# Patient Record
Sex: MALE | Race: WHITE | Employment: OTHER | URBAN - METROPOLITAN AREA
[De-identification: names, ages, dates, MRNs, and addresses within clinical notes are randomized per-mention and may not be internally consistent; named-entity substitution may affect disease eponyms.]

---
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
## 2022-02-09 DIAGNOSIS — G80.0 SPASTIC QUADRIPLEGIC CEREBRAL PALSY (HCC): ICD-10-CM

## 2022-02-09 DIAGNOSIS — Z97.8 PRESENCE OF INTRATHECAL BACLOFEN PUMP: ICD-10-CM

## 2022-02-09 DIAGNOSIS — G40.909 SEIZURE DISORDER (HCC): ICD-10-CM

## 2022-02-10 NOTE — TELEPHONE ENCOUNTER
Pharmacy requesting refill of Depakote. Medication active on med list yes      Date of last fill: 11/3/21  verified on 2/10/2022   verified by [facility] LPN      Date of last appointment 11/3/21    Next Visit Date:  Visit date not found, no follow up scheduled. Please approve for Dr Izaguirre's pt as he is out of office, thanks.

## 2022-02-11 RX ORDER — DIVALPROEX SODIUM 125 MG/1
TABLET, DELAYED RELEASE ORAL
Qty: 56 TABLET | Refills: 0 | Status: SHIPPED | OUTPATIENT
Start: 2022-02-11 | End: 2022-03-15

## 2022-02-16 DIAGNOSIS — G80.0 SPASTIC QUADRIPLEGIC CEREBRAL PALSY (HCC): ICD-10-CM

## 2022-02-16 DIAGNOSIS — G40.909 SEIZURE DISORDER (HCC): ICD-10-CM

## 2022-02-16 DIAGNOSIS — Z97.8 PRESENCE OF INTRATHECAL BACLOFEN PUMP: ICD-10-CM

## 2022-02-17 ENCOUNTER — TELEPHONE (OUTPATIENT)
Dept: PEDIATRIC UROLOGY | Age: 22
End: 2022-02-17

## 2022-02-17 ENCOUNTER — HOSPITAL ENCOUNTER (OUTPATIENT)
Age: 22
Setting detail: SPECIMEN
Discharge: HOME | End: 2022-02-17
Payer: MEDICAID

## 2022-02-17 DIAGNOSIS — R33.9 URINARY RETENTION: Primary | ICD-10-CM

## 2022-02-17 DIAGNOSIS — R33.9 URINARY RETENTION: ICD-10-CM

## 2022-02-17 LAB
-: ABNORMAL
AMORPHOUS: ABNORMAL
BACTERIA: ABNORMAL
BILIRUBIN URINE: NEGATIVE
CASTS UA: ABNORMAL /LPF (ref 0–2)
COLOR: YELLOW
CRYSTALS, UA: ABNORMAL /HPF
EPITHELIAL CELLS UA: ABNORMAL /HPF (ref 0–5)
GLUCOSE URINE: NEGATIVE
KETONES, URINE: NEGATIVE
LEUKOCYTE ESTERASE, URINE: ABNORMAL
MUCUS: ABNORMAL
NITRITE, URINE: NEGATIVE
OTHER OBSERVATIONS UA: ABNORMAL
PH UA: 8 (ref 5–8)
PROTEIN UA: NEGATIVE
RBC UA: ABNORMAL /HPF (ref 0–2)
RENAL EPITHELIAL, UA: ABNORMAL /HPF
SPECIFIC GRAVITY UA: 1.02 (ref 1–1.03)
TRICHOMONAS: ABNORMAL
TURBIDITY: ABNORMAL
URINE HGB: NEGATIVE
UROBILINOGEN, URINE: NORMAL
WBC UA: ABNORMAL /HPF (ref 0–5)
YEAST: ABNORMAL

## 2022-02-17 PROCEDURE — 87086 URINE CULTURE/COLONY COUNT: CPT

## 2022-02-17 PROCEDURE — 81001 URINALYSIS AUTO W/SCOPE: CPT

## 2022-02-17 RX ORDER — DIVALPROEX SODIUM 125 MG/1
TABLET, DELAYED RELEASE ORAL
Qty: 56 TABLET | Refills: 0 | OUTPATIENT
Start: 2022-02-17

## 2022-02-17 NOTE — TELEPHONE ENCOUNTER
Mom called and stated that she feels Marianna Campos has a UTI. She requested an order for UC. Order placed. S/s of cloudy, stomach pain, low grade fever on and off. Volumes are up and down.

## 2022-02-18 LAB
CULTURE: NORMAL
Lab: NORMAL
SPECIMEN DESCRIPTION: NORMAL

## 2022-02-18 NOTE — RESULT ENCOUNTER NOTE
UC negative, although UA + for  WBC's. Mom says the sx of low grade fever, pain come and go. Mom will continue to watch him and repeat the UC if the sx seem to be more consistent. Mom says he gets this way with viral illnesses as well.

## 2022-03-09 DIAGNOSIS — Z97.8 PRESENCE OF INTRATHECAL BACLOFEN PUMP: ICD-10-CM

## 2022-03-09 DIAGNOSIS — G40.909 SEIZURE DISORDER (HCC): ICD-10-CM

## 2022-03-09 DIAGNOSIS — G80.0 SPASTIC QUADRIPLEGIC CEREBRAL PALSY (HCC): ICD-10-CM

## 2022-03-10 NOTE — TELEPHONE ENCOUNTER
Pharmacy requesting refill of levetiracetam.      Medication active on med list yes      Date of last fill: 11/3/21  with 3 refills verified on 3/10/22  verified by BAUDILIO RIVERA      Date of last appointment 11/3/21    Next Visit Date:  3/23/22

## 2022-03-10 NOTE — TELEPHONE ENCOUNTER
Pharmacy requesting refill of depakote.       Medication active on med list yes      Date of last fill: 2/11/22  with 0 refills verified on 3/10/22  verified by BAUDILIO RIVERA      Date of last appointment 11/3/21    Next Visit Date:  3/23/22

## 2022-03-15 RX ORDER — LEVETIRACETAM 100 MG/ML
SOLUTION ORAL
Qty: 450 ML | Refills: 3 | Status: SHIPPED | OUTPATIENT
Start: 2022-03-15 | End: 2022-03-23 | Stop reason: SDUPTHER

## 2022-03-15 RX ORDER — DIVALPROEX SODIUM 125 MG/1
TABLET, DELAYED RELEASE ORAL
Qty: 56 TABLET | Refills: 0 | Status: SHIPPED | OUTPATIENT
Start: 2022-03-15 | End: 2022-03-23 | Stop reason: SDUPTHER

## 2022-03-18 ENCOUNTER — HOSPITAL ENCOUNTER (OUTPATIENT)
Age: 22
Discharge: HOME | End: 2022-03-18
Payer: MEDICAID

## 2022-03-18 DIAGNOSIS — Z51.81 MEDICATION MONITORING ENCOUNTER: ICD-10-CM

## 2022-03-18 DIAGNOSIS — G40.909 SEIZURE DISORDER (HCC): ICD-10-CM

## 2022-03-18 LAB
ABSOLUTE EOS #: 0.63 K/UL (ref 0–0.4)
ABSOLUTE IMMATURE GRANULOCYTE: 0 K/UL (ref 0–0.3)
ABSOLUTE LYMPH #: 2.34 K/UL (ref 1.2–5.2)
ABSOLUTE MONO #: 0.54 K/UL (ref 0.1–1.4)
ALBUMIN SERPL-MCNC: 4.7 G/DL (ref 3.5–5.2)
ALBUMIN/GLOBULIN RATIO: 1.9 (ref 1–2.5)
ALP BLD-CCNC: 106 U/L (ref 40–129)
ALT SERPL-CCNC: 22 U/L (ref 5–41)
ANION GAP SERPL CALCULATED.3IONS-SCNC: 15 MMOL/L (ref 9–17)
AST SERPL-CCNC: 18 U/L
BASOPHILS # BLD: 0 % (ref 0–2)
BASOPHILS ABSOLUTE: 0 K/UL (ref 0–0.2)
BILIRUB SERPL-MCNC: 0.19 MG/DL (ref 0.3–1.2)
BILIRUBIN DIRECT: <0.08 MG/DL
BILIRUBIN, INDIRECT: ABNORMAL MG/DL (ref 0–1)
BUN BLDV-MCNC: 10 MG/DL (ref 6–20)
CALCIUM SERPL-MCNC: 9.6 MG/DL (ref 8.6–10.4)
CHLORIDE BLD-SCNC: 106 MMOL/L (ref 98–107)
CO2: 23 MMOL/L (ref 20–31)
CREAT SERPL-MCNC: 0.37 MG/DL (ref 0.7–1.2)
EOSINOPHILS RELATIVE PERCENT: 7 % (ref 1–4)
GFR NON-AFRICAN AMERICAN: ABNORMAL ML/MIN
GFR SERPL CREATININE-BSD FRML MDRD: ABNORMAL ML/MIN/{1.73_M2}
GLUCOSE BLD-MCNC: 100 MG/DL (ref 70–99)
HCT VFR BLD CALC: 48.5 % (ref 40.7–50.3)
HEMOGLOBIN: 15.8 G/DL (ref 13–17)
IMMATURE GRANULOCYTES: 0 %
LYMPHOCYTES # BLD: 26 % (ref 25–45)
MCH RBC QN AUTO: 29 PG (ref 25.2–33.5)
MCHC RBC AUTO-ENTMCNC: 32.6 G/DL (ref 28.4–34.8)
MCV RBC AUTO: 89.2 FL (ref 82.6–102.9)
MONOCYTES # BLD: 6 % (ref 2–8)
MORPHOLOGY: NORMAL
NRBC AUTOMATED: 0 PER 100 WBC
PDW BLD-RTO: 12.9 % (ref 11.8–14.4)
PLATELET # BLD: 316 K/UL (ref 138–453)
PMV BLD AUTO: 11.5 FL (ref 8.1–13.5)
POTASSIUM SERPL-SCNC: 4.2 MMOL/L (ref 3.7–5.3)
RBC # BLD: 5.44 M/UL (ref 4.21–5.77)
SEG NEUTROPHILS: 61 % (ref 34–64)
SEGMENTED NEUTROPHILS ABSOLUTE COUNT: 5.49 K/UL (ref 1.8–8)
SODIUM BLD-SCNC: 144 MMOL/L (ref 135–144)
TOTAL PROTEIN: 7.2 G/DL (ref 6.4–8.3)
WBC # BLD: 9 K/UL (ref 4.5–13.5)

## 2022-03-18 PROCEDURE — 80048 BASIC METABOLIC PNL TOTAL CA: CPT

## 2022-03-18 PROCEDURE — 85025 COMPLETE CBC W/AUTO DIFF WBC: CPT

## 2022-03-18 PROCEDURE — 80076 HEPATIC FUNCTION PANEL: CPT

## 2022-03-18 PROCEDURE — 36415 COLL VENOUS BLD VENIPUNCTURE: CPT

## 2022-03-23 ENCOUNTER — OFFICE VISIT (OUTPATIENT)
Dept: NEUROLOGY | Age: 22
End: 2022-03-23
Payer: MEDICAID

## 2022-03-23 VITALS
DIASTOLIC BLOOD PRESSURE: 84 MMHG | WEIGHT: 140 LBS | HEART RATE: 77 BPM | SYSTOLIC BLOOD PRESSURE: 132 MMHG | BODY MASS INDEX: 25.76 KG/M2 | HEIGHT: 62 IN

## 2022-03-23 DIAGNOSIS — Z97.8 PRESENCE OF INTRATHECAL BACLOFEN PUMP: ICD-10-CM

## 2022-03-23 DIAGNOSIS — G80.0 SPASTIC QUADRIPLEGIC CEREBRAL PALSY (HCC): ICD-10-CM

## 2022-03-23 DIAGNOSIS — G40.909 SEIZURE DISORDER (HCC): Primary | ICD-10-CM

## 2022-03-23 PROCEDURE — 99214 OFFICE O/P EST MOD 30 MIN: CPT | Performed by: PSYCHIATRY & NEUROLOGY

## 2022-03-23 RX ORDER — DIVALPROEX SODIUM 125 MG/1
TABLET, DELAYED RELEASE ORAL
Qty: 30 TABLET | Refills: 2 | Status: SHIPPED | OUTPATIENT
Start: 2022-03-23 | End: 2022-07-26 | Stop reason: ALTCHOICE

## 2022-03-23 RX ORDER — LEVETIRACETAM 100 MG/ML
SOLUTION ORAL
Qty: 450 ML | Refills: 3 | Status: SHIPPED | OUTPATIENT
Start: 2022-03-23 | End: 2022-07-25

## 2022-03-23 NOTE — PROGRESS NOTES
NEUROLOGY FOLLOW-UP VISIT   Patient Name:       Pierre Hankins  :        2000  Clinic Visit Date:    3/23/2022  LOV: 11/3/21      Dear Dr. Billy Hendricks, DO     I saw Mr. Pierre Hankins in follow-up in the office today in continuation of neurologic care. As you know he  is a 25 y.o. left handed  male with seizure disorder and spastic cerebral palsy. Today he is brought to the clinic by his mom, Ms Paulina Mancuso stating that Vinayak Fong has not had any seizure activity while on downward taper of Depakote. He has been on slow weaning down trial since last summer. He remained seizure-free since . No episodes of jerking/staring/confusion, etc.  No episodes of seizure-like activity while on weaning down doses of Depakote. Patient's mom is happier with weaning Depakote. She noticed AJ to be much more alert, awake and oriented to self and more observant and more conversant while on downward taper of Depakote. She wants to continue the same as he remained seizure-free. Presently he is on Depakote 125+125 for the past 4 months. He is remained on the same dose of Keppra at 750 mg bid along with clonazepam.  Patient had unremarkable EEG in ; seizure-free since ; then after extensive/detailed discussion done with patient's mom; he was initiated on downward weaning trial of Depakote. EEG () was negative for epileptiform discharges are electrographic seizures. Regarding spasticity; on baclofen pump and clonazepam; those are being managed through physiatrist at [facility]. He also follows up with pediatric urologist.    During the initial visit in ; brought to the clinic by his mom stating \"transitioning from pediatric neurologist to adult neurologist\". 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  He used to have staring spells earlier and later he was Swallowing change    [] Urinary Frequency  [] Urinary Urgency   Musculoskeletal [] Neck pain  [] Back pain  [] Muscle pain  [] Restless legs   Dermatologic [] Skin changes   Neurologic [] Memory loss/confusion  [] Seizures  [x] Trouble walking or imbalance (wheel chair bound)  [] Dizziness  [] Weakness  [] Numbness  [] Tremors  [] Speech Difficulty  [] Headaches  [] Light Sensitivity  [] Sound Sensitivity   Endocrinology []Excessive thirst  []Excessive hunger   Psychiatric [x] Anxiety/Depression  [] Hallucination   Allergy/immunology []Hives/environmental allergies   Hematologic/lymph [] Abnormal bleeding  [] Abnormal bruising       Current Outpatient Medications on File Prior to Visit   Medication Sig Dispense Refill    divalproex (DEPAKOTE) 125 MG DR tablet TAKE 1 TABLET BY MOUTH EVERY MORNING & AT BEDTIME 56 tablet 0    levETIRAcetam (KEPPRA) 100 MG/ML solution TAKE 7.5 ML TWICE DAILY 450 mL 3    nitrofurantoin, macrocrystal-monohydrate, (MACROBID) 100 MG capsule Take 1 capsule by mouth daily 30 capsule 12    diazePAM (VALIUM) 10 MG tablet Take 5 mg by mouth as needed.  clonazePAM (KLONOPIN) 0.5 MG tablet 0.5 mg. 0.5 am and 0.5noon  (2 )0.5 mg at night      gabapentin (NEURONTIN) 100 MG capsule Take 100 mg by mouth 3 times daily.  albuterol sulfate  (90 Base) MCG/ACT inhaler Inhale 2 puffs into the lungs every 4 hours as needed      polyethylene glycol (GLYCOLAX) 17 GM/SCOOP powder Take 17 g by mouth daily      Omeprazole (PRILOSEC PO) Take by mouth      Catheters MISC 16 Fr. Latex free, coude tip intermittent urinary catheters. Use as directed 4 times daily.  120 each 11    trihexyphenidyl (ARTANE) 2 MG tablet Take 4 mg by mouth 3 times daily      loratadine (CLARITIN) 10 MG tablet Take 10 mg by mouth daily      montelukast (SINGULAIR) 10 MG tablet Take 10 mg by mouth nightly      Melatonin 10 MG TABS Take 10 mg by mouth nightly      BACLOFEN, PAIN PUMP REFILL CHARGE,       glycopyrrolate (CUVPOSA) 1 MG/5ML SOLN solution Take 900 mcg by mouth 3 times daily (Patient not taking: Reported on 11/3/2021)       No current facility-administered medications on file prior to visit. Allergies: Rae Simms is allergic to sulfa antibiotics and vancomycin. Past Medical History:   Diagnosis Date    [facility] ([facility]     Seizures ([facility] [facility]        Past Surgical History:   Procedure Laterality Date    BACK SURGERY      BACLOFEN PUMP IMPLANTATION      GASTRIC FUNDOPLICATION      HIP SURGERY       Social History: Rae Simms  reports that he has never smoked. He has never used smokeless tobacco. He reports that he does not drink alcohol and does not use drugs. History reviewed. No pertinent family history. On exam: Vitals: Blood pressure 132/84, pulse 77, height 5' 2\" (1.575 m), weight 140 lb (63.5 kg). 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     HEENT  NC/ AT   NECK  Supple and no bruits heard   MENTAL STATUS:  Alert, oriented to self, place and month and year (\"Priori Data, September\" ).,  Impaired immediate recall,., normal speech, able to name the objects and able to repeat sentences; difficulty with serial subtractions; no hallucination or delusion; appropriate affect   CRANIAL NERVES: II     -      Pupils bilaterally reactive; blinks to threat bilaterally  III,IV,VI -right gaze preference; no afferent defect, no SHELLEY, no ptosis  V     -     Normal facial sensation  VII    -     Normal facial symmetry  VIII   -     Intact hearing  IX,X -     Symmetrical palate  XI    -     Reduced shoulder shrug bilaterally  XII   -     Midline tongue, no atrophy    MOTOR FUNCTION:  significant for mild weakness of grade 4+/5 in left upper extremity; moderate weakness of grade 4-/5 in right hand intrinsics; 0/5 weakness in bilateral lower extremities with flexion contractures at both knees and ankles with spasticity in lower extremities   SENSORY FUNCTION:  Normal touch, normal pin, normal vibration, normal proprioception   CEREBELLAR FUNCTION:  No tremors and significantly impaired fine motor control over upper limbs   REFLEX FUNCTION:  Symmetric, no perverted reflex, mute plantars   STATION and GAIT  wheel chair bound. Diagnostic data reviewed with the patient:   CT head 3/29/2018: No evidence of acute intracranial process. EEG 4/2/2018: Abnormal EEG with nonspecific diffuse cerebral dysfunction with multifocal spikes indicating presence of potential seizure disorder, generalized or multifocal in etiology. Blood work done (CBC done x3)  at [facility]:  Month of Nov 2019  CBC: WBC 4.7, hemoglobin 14.2, PLT 166K    EEG 1/7/2021[de-identified] Done at [facility]: Abnormal routine awake EEG due to the presence of generalized slowing in mid to upper theta range with some disorganization related to superimposed fast activity in the beta range. This fast activity may be related to medication effect. No focal slowing or epileptiform activity is noted. No electrographic seizures seen. MRI brain (w/wo) 11/4/2020: No acute intracranial pathology. No abnormal postcontrast enhancement.   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      BMP:     Lab Results   Component Value Date     03/18/2022    K 4.2 03/18/2022    GLUCOSE 100 (H) 03/18/2022    CALCIUM 9.6 03/18/2022     CBC:   Lab Results   Component Value Date    WBC 9.0 03/18/2022    HGB 15.8 03/18/2022    HCT 48.5 03/18/2022    MCV 89.2 03/18/2022     03/18/2022     Lab Results   Component Value Date    VALPROATE total level 95 () 09/09/2020    VALPROATE free level 7.9 (7-23) 09/09/2020     LFT:  Lab Results   Component Value Date    ALT 22 03/18/2022    AST 18 03/18/2022    ALKPHOS 106 03/18/2022    BILITOT 0.19 (L) 03/18/2022 Impression and Plan: Mr. Boo Dykes is a 25 y.o. male with   Seizure disorder; nonconvulsive staring spells & tonic sz with sec gen; sz free since 2012; on weaning down doses of Depakote, presently taking 125+ 125 for the past 4 months; will decrease the dose to none in am +125 from beginning of April until end of June and stop it altogether; follow up visit afterwards in July  But to continue Keppra 750 bid & Clonazepam 0.5 +1.0/d at same dose. Most recent CBC, BMP/ LFT were reviewed with patient's mom at bedside. For spasticity; on baclofen pump; on 394 mcg per day --> 424 mcg per day as per [facility]. Neuropathic pain in lower extremities since tendon lengthening since 2019; tolerable on Gabapentin  100 tid. Therapeutic monitoring: BMP, LFT, CBC as above. As Depakote is being tapered down, I do not see any need to check Depakote levels. Follow-up in clinic in July 2022      This note was partially created using voice recognition software and is inherently subject to errors including those of syntax and \"sound alike\" substitutions which may escape proofreading. In such instances, original meaning may be extrapolated by contextual derivation. Cardiopulmonary Arrest

## 2022-04-08 ENCOUNTER — APPOINTMENT (OUTPATIENT)
Dept: GENERAL RADIOLOGY | Age: 22
End: 2022-04-08
Payer: MEDICAID

## 2022-04-08 ENCOUNTER — HOSPITAL ENCOUNTER (EMERGENCY)
Age: 22
Discharge: HOME | End: 2022-04-08
Attending: EMERGENCY MEDICINE
Payer: MEDICAID

## 2022-04-08 VITALS
TEMPERATURE: 98.2 F | HEIGHT: 62 IN | OXYGEN SATURATION: 93 % | WEIGHT: 140 LBS | DIASTOLIC BLOOD PRESSURE: 83 MMHG | SYSTOLIC BLOOD PRESSURE: 136 MMHG | HEART RATE: 105 BPM | RESPIRATION RATE: 18 BRPM | BODY MASS INDEX: 25.76 KG/M2

## 2022-04-08 DIAGNOSIS — M62.838 SPASM OF MUSCLE: Primary | ICD-10-CM

## 2022-04-08 LAB
-: ABNORMAL
ABSOLUTE EOS #: 0.3 K/UL (ref 0–0.4)
ABSOLUTE LYMPH #: 1.7 K/UL (ref 1.2–5.2)
ABSOLUTE MONO #: 0.8 K/UL (ref 0.1–1.4)
ALBUMIN SERPL-MCNC: 4.5 G/DL (ref 3.5–5.2)
ALBUMIN/GLOBULIN RATIO: 1.5 (ref 1–2.5)
ALP BLD-CCNC: 101 U/L (ref 40–129)
ALT SERPL-CCNC: 22 U/L (ref 5–41)
ANION GAP SERPL CALCULATED.3IONS-SCNC: 13 MMOL/L (ref 9–17)
AST SERPL-CCNC: 18 U/L
BACTERIA: ABNORMAL
BASOPHILS # BLD: 0 % (ref 0–2)
BASOPHILS ABSOLUTE: 0 K/UL (ref 0–0.2)
BILIRUB SERPL-MCNC: 0.78 MG/DL (ref 0.3–1.2)
BILIRUBIN URINE: NEGATIVE
BUN BLDV-MCNC: 7 MG/DL (ref 6–20)
CALCIUM SERPL-MCNC: 9.4 MG/DL (ref 8.6–10.4)
CHLORIDE BLD-SCNC: 100 MMOL/L (ref 98–107)
CO2: 25 MMOL/L (ref 20–31)
COLOR: YELLOW
CREAT SERPL-MCNC: <0.4 MG/DL (ref 0.7–1.2)
EOSINOPHILS RELATIVE PERCENT: 4 % (ref 1–4)
EPITHELIAL CELLS UA: ABNORMAL /HPF (ref 0–5)
GFR AFRICAN AMERICAN: ABNORMAL ML/MIN
GFR NON-AFRICAN AMERICAN: ABNORMAL ML/MIN
GFR SERPL CREATININE-BSD FRML MDRD: ABNORMAL ML/MIN/{1.73_M2}
GLUCOSE BLD-MCNC: 83 MG/DL (ref 70–99)
GLUCOSE URINE: NEGATIVE
HCT VFR BLD CALC: 45.7 % (ref 41–53)
HEMOGLOBIN: 15.7 G/DL (ref 13.5–17.5)
KETONES, URINE: ABNORMAL
LACTIC ACID, SEPSIS: 1 MMOL/L (ref 0.5–1.9)
LEUKOCYTE ESTERASE, URINE: ABNORMAL
LYMPHOCYTES # BLD: 22 % (ref 25–45)
MCH RBC QN AUTO: 29.5 PG (ref 26–34)
MCHC RBC AUTO-ENTMCNC: 34.4 G/DL (ref 31–37)
MCV RBC AUTO: 85.7 FL (ref 80–100)
MONOCYTES # BLD: 10 % (ref 2–8)
NITRITE, URINE: NEGATIVE
PDW BLD-RTO: 14.1 % (ref 12.5–15.4)
PH UA: 6.5 (ref 5–8)
PLATELET # BLD: 236 K/UL (ref 140–450)
PMV BLD AUTO: 8.4 FL (ref 6–12)
POTASSIUM SERPL-SCNC: 4.1 MMOL/L (ref 3.7–5.3)
PROTEIN UA: NEGATIVE
RBC # BLD: 5.33 M/UL (ref 4.5–5.9)
RBC UA: ABNORMAL /HPF (ref 0–2)
SEG NEUTROPHILS: 64 % (ref 34–64)
SEGMENTED NEUTROPHILS ABSOLUTE COUNT: 5 K/UL (ref 1.8–8)
SODIUM BLD-SCNC: 138 MMOL/L (ref 135–144)
SPECIFIC GRAVITY UA: 1.01 (ref 1–1.03)
TOTAL PROTEIN: 7.6 G/DL (ref 6.4–8.3)
TURBIDITY: CLEAR
URINE HGB: NEGATIVE
UROBILINOGEN, URINE: NORMAL
WBC # BLD: 7.9 K/UL (ref 4.5–13.5)
WBC UA: ABNORMAL /HPF (ref 0–5)

## 2022-04-08 PROCEDURE — 85025 COMPLETE CBC W/AUTO DIFF WBC: CPT

## 2022-04-08 PROCEDURE — 6370000000 HC RX 637 (ALT 250 FOR IP): Performed by: EMERGENCY MEDICINE

## 2022-04-08 PROCEDURE — 71045 X-RAY EXAM CHEST 1 VIEW: CPT

## 2022-04-08 PROCEDURE — 83605 ASSAY OF LACTIC ACID: CPT

## 2022-04-08 PROCEDURE — 36415 COLL VENOUS BLD VENIPUNCTURE: CPT

## 2022-04-08 PROCEDURE — 80053 COMPREHEN METABOLIC PANEL: CPT

## 2022-04-08 PROCEDURE — 99285 EMERGENCY DEPT VISIT HI MDM: CPT

## 2022-04-08 PROCEDURE — 96374 THER/PROPH/DIAG INJ IV PUSH: CPT

## 2022-04-08 PROCEDURE — 6360000002 HC RX W HCPCS: Performed by: EMERGENCY MEDICINE

## 2022-04-08 PROCEDURE — 2580000003 HC RX 258: Performed by: EMERGENCY MEDICINE

## 2022-04-08 PROCEDURE — 81001 URINALYSIS AUTO W/SCOPE: CPT

## 2022-04-08 PROCEDURE — 87086 URINE CULTURE/COLONY COUNT: CPT

## 2022-04-08 RX ORDER — 0.9 % SODIUM CHLORIDE 0.9 %
500 INTRAVENOUS SOLUTION INTRAVENOUS ONCE
Status: COMPLETED | OUTPATIENT
Start: 2022-04-08 | End: 2022-04-08

## 2022-04-08 RX ORDER — KETOROLAC TROMETHAMINE 30 MG/ML
30 INJECTION, SOLUTION INTRAMUSCULAR; INTRAVENOUS ONCE
Status: COMPLETED | OUTPATIENT
Start: 2022-04-08 | End: 2022-04-08

## 2022-04-08 RX ORDER — DIAZEPAM 5 MG/1
7.5 TABLET ORAL ONCE
Status: COMPLETED | OUTPATIENT
Start: 2022-04-08 | End: 2022-04-08

## 2022-04-08 RX ADMIN — DIAZEPAM 7.5 MG: 5 TABLET ORAL at 11:45

## 2022-04-08 RX ADMIN — KETOROLAC TROMETHAMINE 30 MG: 30 INJECTION, SOLUTION INTRAMUSCULAR at 10:16

## 2022-04-08 RX ADMIN — SODIUM CHLORIDE 500 ML: 9 INJECTION, SOLUTION INTRAVENOUS at 11:43

## 2022-04-08 ASSESSMENT — ENCOUNTER SYMPTOMS
CHEST TIGHTNESS: 0
CONSTIPATION: 0
RHINORRHEA: 1
VOMITING: 0
PHOTOPHOBIA: 0
ABDOMINAL PAIN: 0
DIARRHEA: 1
NAUSEA: 0
SHORTNESS OF BREATH: 0
COUGH: 1

## 2022-04-08 ASSESSMENT — PAIN SCALES - WONG BAKER
WONGBAKER_NUMERICALRESPONSE: 2
WONGBAKER_NUMERICALRESPONSE: 8

## 2022-04-08 ASSESSMENT — PAIN DESCRIPTION - PAIN TYPE
TYPE: ACUTE PAIN
TYPE: ACUTE PAIN

## 2022-04-08 ASSESSMENT — PAIN SCALES - GENERAL: PAINLEVEL_OUTOF10: 8

## 2022-04-08 NOTE — ED PROVIDER NOTES
[id number] [facility]  [address] [facility] [address] [facility] [address]  Phone: [phone]  Fax: [phone]        Pt Name: Jessica Guillory  MRN: [id number]  [place] 2000  Date of evaluation: 4/8/22      CHIEF COMPLAINT     Chief Complaint   Patient presents with    Spasms     Arms and legs. PRN valium used/PRN baclofen, Mother states not working to control. HISTORY OF PRESENT ILLNESS  (Location/Symptom, Timing/Onset, Context/Setting, Quality, Duration, Modifying Factors, Severity.)    Jessica Guillory is a 25 y.o. male who presents with spasms. The patient has cerebral palsy and has a baclofen pump. The patient has been having worsening spasms intermittently for a month. This episode started on Monday. Mom states she has given 20 mg extra of baclofen orally at 8:00 AM. Tylenol 100 mg at 8:30 AM. He has also had valium last night at midnight, 7.5 mg. He also takes Clonazepam 1 mg at night and 0.5 mg in the AM. He also takes gabapentin 100 mg three times daily. He is due to have his pump refilled on  May 18 th. His mom states he had an increase in his pump does at his last visit and gets a programmed bolus of baclofen in the morning each. His base rate is 404 mcg and he gets a bolus of 19 mcg. The patient has also had a cough recently. He had a cough and diarrhea and was out of school for 2 weeks. He went back to school last week. His mom feels his cough has started again. Mom feels his lungs are gunky. He does take some things orally, as dictated by a plan for what is appropriate for him to take in orally. He also has PEG feedings. He had blood work done by Dr Gal Rutherford for a visit on March 23. He also had a UA done relatively recently. He has had a bit of tube feed coming out around his PEG site and he had diarrhea yesterday morning and twice last night. Mom states his temp has been running 99.4 F. Mom feels he has sounded raspy in his throat.      REVIEW OF SYSTEMS    (2-9 systems for level 4, 10 or more for level 5)     Review of Systems   Constitutional: Positive for chills. Negative for fever. HENT: Positive for congestion and rhinorrhea. Eyes: Negative for photophobia and visual disturbance. Respiratory: Positive for cough. Negative for chest tightness and shortness of breath. Cardiovascular: Negative for chest pain and palpitations. Gastrointestinal: Positive for diarrhea. Negative for abdominal pain, constipation, nausea and vomiting. Genitourinary: Negative for dysuria, frequency and urgency. Skin: Negative for rash and wound. PAST MEDICAL HISTORY    has a past medical history of Cerebral palsy ([facility] [address] and Seizures ([facility] [address] SURGICAL HISTORY      has a past surgical history that includes back surgery; baclofen pump implantation; hip surgery; and Gastric fundoplication. CURRENTMEDICATIONS       Previous Medications    ALBUTEROL SULFATE  (90 BASE) MCG/ACT INHALER    Inhale 2 puffs into the lungs every 4 hours as needed    BACLOFEN, PAIN PUMP REFILL CHARGE,        CATHETERS MISC    16 Fr. Latex free, coude tip intermittent urinary catheters. Use as directed 4 times daily. CLONAZEPAM (KLONOPIN) 0.5 MG TABLET    0.5 mg. 0.5 am and 0.5noon  (2 )0.5 mg at night    DIAZEPAM (VALIUM) 10 MG TABLET    Take 5 mg by mouth as needed. DIVALPROEX (DEPAKOTE) 125 MG DR TABLET    Take one tab at bedtime (on weaning down trial)    FEXOFENADINE HCL (MUCINEX ALLERGY PO)    Take by mouth    FLUTICASONE PROPIONATE (FLONASE NA)    by Nasal route    GABAPENTIN (NEURONTIN) 100 MG CAPSULE    Take 100 mg by mouth 3 times daily.      GLYCOPYRROLATE (CUVPOSA) 1 MG/5ML SOLN SOLUTION    Take 900 mcg by mouth 3 times daily    LEVETIRACETAM (KEPPRA) 100 MG/ML SOLUTION    Take 7.5 ml twice daily    LORATADINE (CLARITIN) 10 MG TABLET    Take 10 mg by mouth daily    MELATONIN 10 MG TABS    Take 10 mg by mouth nightly    MONTELUKAST (SINGULAIR) 10 MG TABLET    Take 10 mg by mouth nightly NITROFURANTOIN, MACROCRYSTAL-MONOHYDRATE, (MACROBID) 100 MG CAPSULE    Take 1 capsule by mouth daily    OMEPRAZOLE (PRILOSEC PO)    Take by mouth    POLYETHYLENE GLYCOL (GLYCOLAX) 17 GM/SCOOP POWDER    Take 17 g by mouth daily    TRIHEXYPHENIDYL (ARTANE) 2 MG TABLET    Take 4 mg by mouth 3 times daily       ALLERGIES     is allergic to sulfa antibiotics and vancomycin. FAMILY HISTORY     has no family status information on file. family history is not on file. SOCIAL HISTORY      reports that he has never smoked. He has never used smokeless tobacco. He reports that he does not drink alcohol and does not use drugs. PHYSICAL EXAM    (up to 7 for level 4, 8 or more for level 5)   INITIAL VITALS:  height is 5' 2\" (1.575 m) and weight is 63.5 kg (140 lb). His temporal temperature is 98.2 °F (36.8 °C). His blood pressure is 128/76 and his pulse is 105. His respiration is 18 and oxygen saturation is 93%. Physical Exam  Vitals and nursing note reviewed. Constitutional:       Appearance: He is not ill-appearing. HENT:      Head: Normocephalic and atraumatic. Eyes:      Extraocular Movements: Extraocular movements intact. Pupils: Pupils are equal, round, and reactive to light. Cardiovascular:      Rate and Rhythm: Regular rhythm. Tachycardia present. Pulses: Normal pulses. Heart sounds: Normal heart sounds. No murmur heard. No friction rub. No gallop. Pulmonary:      Effort: Pulmonary effort is normal.      Breath sounds: Normal breath sounds. No wheezing, rhonchi or rales. Abdominal:      General: Abdomen is flat. Bowel sounds are normal.      Palpations: Abdomen is soft. Tenderness: There is no abdominal tenderness. There is no guarding or rebound. Comments: PEG site is clean and non-erythematous   Musculoskeletal:         General: No tenderness. Cervical back: Normal range of motion and neck supple. Comments: Contracture of limbs secondary to CP.  No erythema. No deformity. Skin:     General: Skin is warm and dry. Capillary Refill: Capillary refill takes less than 2 seconds. Neurological:      Mental Status: He is alert. Mental status is at baseline. Psychiatric:      Comments: Smiling as I speak to him. DIFFERENTIAL DIAGNOSIS/ MDM:     82-year-old male here with worsening spasticity. Patient has a known history of cerebral palsy, and has multiple medications as well as a baclofen pump to treat his spasticity. Mom reports he has had a mild cough, and diarrhea. I think he has a viral process ongoing, and this is exacerbating his baseline spasticity. He improved in the emergency department with Toradol and IV fluids. Plan for discharge home with follow-up with his physicians in [facility] [place] regarding his spasticity    DIAGNOSTIC RESULTS     EKG: All EKG's are interpreted by the Emergency Department Physician who either signs or Co-signs this chart in the absence of a cardiologist.    none    RADIOLOGY:        Interpretation per the Radiologist below, if available at the time of this note:    XR CHEST PORTABLE    Result Date: 4/8/2022  EXAMINATION: [address] 4/8/2022 10:52 am COMPARISON: None. HISTORY: ORDERING SYSTEM PROVIDED HISTORY: cough TECHNOLOGIST PROVIDED HISTORY: cough Reason for Exam: cough FINDINGS: Loops of distended air-filled large bowel are seen positioned between the liver and right hemidiaphragm. The right hemidiaphragm is elevated as a result. No cardiomegaly. No acute airspace infiltrate. No pneumothorax or pleural effusion.   Spinal fusion hardware appears intact     No acute cardiopulmonary findings       LABS:  Results for orders placed or performed during the hospital encounter of 04/08/22   CBC with Auto Differential   Result Value Ref Range    WBC 7.9 4.5 - 13.5 k/uL    RBC 5.33 4.5 - 5.9 m/uL    Hemoglobin 15.7 13.5 - 17.5 g/dL    Hematocrit 45.7 41 - 53 %    MCV 85.7 80 - 100 fL    MCH 29.5 26 - 34 pg    MCHC 34.4 31 - 37 g/dL    RDW 14.1 12.5 - 15.4 %    Platelets 614 038 - 821 k/uL    MPV 8.4 6.0 - 12.0 fL    Seg Neutrophils 64 34 - 64 %    Lymphocytes 22 (L) 25 - 45 %    Monocytes 10 (H) 2 - 8 %    Eosinophils % 4 1 - 4 %    Basophils 0 0 - 2 %    Segs Absolute 5.00 1.8 - 8.0 k/uL    Absolute Lymph # 1.70 1.2 - 5.2 k/uL    Absolute Mono # 0.80 0.1 - 1.4 k/uL    Absolute Eos # 0.30 0.0 - 0.4 k/uL    Basophils Absolute 0.00 0.0 - 0.2 k/uL   Comprehensive Metabolic Panel   Result Value Ref Range    Glucose 83 70 - 99 mg/dL    BUN 7 6 - 20 mg/dL    CREATININE <0.40 (L) 0.70 - 1.20 mg/dL    Calcium 9.4 8.6 - 10.4 mg/dL    Sodium 138 135 - 144 mmol/L    Potassium 4.1 3.7 - 5.3 mmol/L    Chloride 100 98 - 107 mmol/L    CO2 25 20 - 31 mmol/L    Anion Gap 13 9 - 17 mmol/L    Alkaline Phosphatase 101 40 - 129 U/L    ALT 22 5 - 41 U/L    AST 18 <40 U/L    Total Bilirubin 0.78 0.3 - 1.2 mg/dL    Total Protein 7.6 6.4 - 8.3 g/dL    Albumin 4.5 3.5 - 5.2 g/dL    Albumin/Globulin Ratio 1.5 1.0 - 2.5    GFR Non- Can not be calculated >60 mL/min    GFR  Can not be calculated >60 mL/min    GFR Comment         Lactate, Sepsis   Result Value Ref Range    Lactic Acid, Sepsis 1.0 0.5 - 1.9 mmol/L   Urinalysis with Microscopic   Result Value Ref Range    Color, UA Yellow Yellow    Turbidity UA Clear Clear    Glucose, Ur NEGATIVE NEGATIVE    Bilirubin Urine NEGATIVE NEGATIVE    Ketones, Urine TRACE (A) NEGATIVE    Specific Gravity, UA 1.007 1.005 - 1.030    Urine Hgb NEGATIVE NEGATIVE    pH, UA 6.5 5.0 - 8.0    Protein, UA NEGATIVE NEGATIVE    Urobilinogen, Urine Normal Normal    Nitrite, Urine NEGATIVE NEGATIVE    Leukocyte Esterase, Urine TRACE (A) NEGATIVE    -          WBC, UA 0 TO 2 0 - 5 /HPF    RBC, UA 0 TO 2 0 - 2 /HPF    Epithelial Cells UA 0 TO 2 0 - 5 /HPF    Bacteria, UA FEW (A) None       No significant lab abnormalities     EMERGENCY DEPARTMENT COURSE:   Vitals:    Vitals: 04/08/22 0924   BP: 128/76   Pulse: 105   Resp: 18   Temp: 98.2 °F (36.8 °C)   TempSrc: Temporal   SpO2: 93%   Weight: 63.5 kg (140 lb)   Height: 5' 2\" (1.575 m)     -------------------------  BP: 128/76, Temp: 98.2 °F (36.8 °C), Pulse: 105, Resp: 18      RE-EVALUATION:  11:17 AM EDT  Pain improved after Toradol. Mom states he would normally have his valium dose by now, and he takes 7.5 mg. I have ordered this. IV fluids ordered. 12:27 PM EDT  Patient is improved. Plan for discharge home.      CONSULTS:  none    PROCEDURES:  None    FINAL IMPRESSION      1. Spasm of muscle          DISPOSITION/PLAN   DISPOSITION        CONDITION ON DISPOSITION:   Stable     PATIENT REFERRED TO:  [address] [place] [address]  [address] [place] [address]  [phone]    Schedule an appointment as soon as possible for a visit in 2 days        DISCHARGE MEDICATIONS:  New Prescriptions    No medications on file       (Please note that portions of this note were completed with a voicerecognition program.  Efforts were made to edit the dictations but occasionally words are mis-transcribed.)    Edith Jensen MD  Attending Emergency Medicine Physician        Edith Jensen MD  04/08/22 1226       Edith Jensen MD  04/08/22 4030

## 2022-04-08 NOTE — ED NOTES
Pt states pain is better now than when he came in. States pain is \"little. \"     Argelia Hurley RN  04/08/22 7006

## 2022-04-10 LAB
CULTURE: NO GROWTH
SPECIMEN DESCRIPTION: NORMAL

## 2022-04-25 ENCOUNTER — APPOINTMENT (OUTPATIENT)
Dept: GENERAL RADIOLOGY | Age: 22
End: 2022-04-25
Payer: MEDICAID

## 2022-04-25 ENCOUNTER — HOSPITAL ENCOUNTER (EMERGENCY)
Age: 22
Discharge: HOME | End: 2022-04-25
Attending: EMERGENCY MEDICINE
Payer: MEDICAID

## 2022-04-25 VITALS
BODY MASS INDEX: 25.76 KG/M2 | OXYGEN SATURATION: 97 % | DIASTOLIC BLOOD PRESSURE: 90 MMHG | TEMPERATURE: 98.3 F | WEIGHT: 140 LBS | HEIGHT: 62 IN | RESPIRATION RATE: 16 BRPM | HEART RATE: 80 BPM | SYSTOLIC BLOOD PRESSURE: 134 MMHG

## 2022-04-25 DIAGNOSIS — M62.838 SPASM OF MUSCLE: Primary | ICD-10-CM

## 2022-04-25 DIAGNOSIS — N39.0 URINARY TRACT INFECTION WITHOUT HEMATURIA, SITE UNSPECIFIED: ICD-10-CM

## 2022-04-25 LAB
-: ABNORMAL
ABSOLUTE EOS #: 0.1 K/UL (ref 0–0.4)
ABSOLUTE LYMPH #: 3.4 K/UL (ref 1.2–5.2)
ABSOLUTE MONO #: 0.8 K/UL (ref 0.1–1.4)
ALBUMIN SERPL-MCNC: 4.1 G/DL (ref 3.5–5.2)
ALBUMIN/GLOBULIN RATIO: 1.5 (ref 1–2.5)
ALP BLD-CCNC: 81 U/L (ref 40–129)
ALT SERPL-CCNC: 30 U/L (ref 5–41)
ANION GAP SERPL CALCULATED.3IONS-SCNC: 13 MMOL/L (ref 9–17)
AST SERPL-CCNC: 17 U/L
BACTERIA: ABNORMAL
BASOPHILS # BLD: 1 % (ref 0–2)
BASOPHILS ABSOLUTE: 0.1 K/UL (ref 0–0.2)
BILIRUB SERPL-MCNC: 0.36 MG/DL (ref 0.3–1.2)
BILIRUBIN DIRECT: 0.1 MG/DL
BILIRUBIN URINE: NEGATIVE
BILIRUBIN, INDIRECT: 0.26 MG/DL (ref 0–1)
BUN BLDV-MCNC: 14 MG/DL (ref 6–20)
CALCIUM SERPL-MCNC: 9.5 MG/DL (ref 8.6–10.4)
CHLORIDE BLD-SCNC: 104 MMOL/L (ref 98–107)
CO2: 25 MMOL/L (ref 20–31)
COLOR: YELLOW
CREAT SERPL-MCNC: <0.4 MG/DL (ref 0.7–1.2)
EOSINOPHILS RELATIVE PERCENT: 1 % (ref 1–4)
EPITHELIAL CELLS UA: ABNORMAL /HPF (ref 0–5)
GFR AFRICAN AMERICAN: ABNORMAL ML/MIN
GFR NON-AFRICAN AMERICAN: ABNORMAL ML/MIN
GFR SERPL CREATININE-BSD FRML MDRD: ABNORMAL ML/MIN/{1.73_M2}
GLUCOSE BLD-MCNC: 86 MG/DL (ref 70–99)
GLUCOSE URINE: NEGATIVE
HCT VFR BLD CALC: 41.7 % (ref 41–53)
HEMOGLOBIN: 14.3 G/DL (ref 13.5–17.5)
KETONES, URINE: ABNORMAL
LEUKOCYTE ESTERASE, URINE: ABNORMAL
LYMPHOCYTES # BLD: 34 % (ref 25–45)
MCH RBC QN AUTO: 29.3 PG (ref 26–34)
MCHC RBC AUTO-ENTMCNC: 34.4 G/DL (ref 31–37)
MCV RBC AUTO: 85.4 FL (ref 80–100)
MONOCYTES # BLD: 8 % (ref 2–8)
MUCUS: ABNORMAL
NITRITE, URINE: NEGATIVE
OTHER OBSERVATIONS UA: ABNORMAL
PDW BLD-RTO: 13.7 % (ref 12.5–15.4)
PH UA: 6 (ref 5–8)
PLATELET # BLD: 251 K/UL (ref 140–450)
PMV BLD AUTO: 8 FL (ref 6–12)
POTASSIUM SERPL-SCNC: 3.9 MMOL/L (ref 3.7–5.3)
PROTEIN UA: NEGATIVE
RBC # BLD: 4.88 M/UL (ref 4.5–5.9)
RBC UA: ABNORMAL /HPF (ref 0–2)
SEG NEUTROPHILS: 56 % (ref 34–64)
SEGMENTED NEUTROPHILS ABSOLUTE COUNT: 5.5 K/UL (ref 1.8–8)
SODIUM BLD-SCNC: 142 MMOL/L (ref 135–144)
SPECIFIC GRAVITY UA: 1.02 (ref 1–1.03)
TOTAL PROTEIN: 6.8 G/DL (ref 6.4–8.3)
TURBIDITY: CLEAR
URINE HGB: NEGATIVE
UROBILINOGEN, URINE: NORMAL
VALPROIC ACID LEVEL: 16 UG/ML (ref 50–125)
VALPROIC DATE LAST DOSE: ABNORMAL
VALPROIC DOSE AMOUNT: ABNORMAL
VALPROIC TIME LAST DOSE: ABNORMAL
WBC # BLD: 9.9 K/UL (ref 4.5–13.5)
WBC UA: ABNORMAL /HPF (ref 0–5)

## 2022-04-25 PROCEDURE — 80048 BASIC METABOLIC PNL TOTAL CA: CPT

## 2022-04-25 PROCEDURE — 85025 COMPLETE CBC W/AUTO DIFF WBC: CPT

## 2022-04-25 PROCEDURE — 99284 EMERGENCY DEPT VISIT MOD MDM: CPT

## 2022-04-25 PROCEDURE — 81001 URINALYSIS AUTO W/SCOPE: CPT

## 2022-04-25 PROCEDURE — 71045 X-RAY EXAM CHEST 1 VIEW: CPT

## 2022-04-25 PROCEDURE — 80076 HEPATIC FUNCTION PANEL: CPT

## 2022-04-25 PROCEDURE — 36415 COLL VENOUS BLD VENIPUNCTURE: CPT

## 2022-04-25 PROCEDURE — 87086 URINE CULTURE/COLONY COUNT: CPT

## 2022-04-25 PROCEDURE — 80164 ASSAY DIPROPYLACETIC ACD TOT: CPT

## 2022-04-25 RX ORDER — CEPHALEXIN 250 MG/5ML
500 POWDER, FOR SUSPENSION ORAL 3 TIMES DAILY
Qty: 300 ML | Refills: 0 | Status: SHIPPED | OUTPATIENT
Start: 2022-04-25 | End: 2022-05-05

## 2022-04-25 ASSESSMENT — PAIN DESCRIPTION - LOCATION: LOCATION: LEG

## 2022-04-25 ASSESSMENT — PAIN DESCRIPTION - PAIN TYPE: TYPE: ACUTE PAIN

## 2022-04-25 ASSESSMENT — PAIN SCALES - GENERAL: PAINLEVEL_OUTOF10: 5

## 2022-04-25 ASSESSMENT — ENCOUNTER SYMPTOMS
COUGH: 1
SHORTNESS OF BREATH: 0
DIARRHEA: 0
VOMITING: 0

## 2022-04-25 ASSESSMENT — PAIN DESCRIPTION - ORIENTATION: ORIENTATION: RIGHT;LEFT

## 2022-04-25 NOTE — ED PROVIDER NOTES
[address] [facility] ED  [address] [facility] [address]  Phone: [phone]  Fax: [phone]        Pt Name: Kit Closs  MRN: [id number]  [place] 2000  Date of evaluation: 4/25/22      CHIEF COMPLAINT     Chief Complaint   Patient presents with    Spasms     Quadraplegic on Baclofen pump. involuntary leg spasms         HISTORY OF PRESENT ILLNESS  (Location/Symptom, Timing/Onset, Context/Setting, Quality, Duration, Modifying Factors, Severity.)    Kit Closs is a 25 y.o. male who presents with increased spasming sensation the patient is on baclofen for spastic quadriplegic cerebellar palsy he has a baclofen pump as well as he can take baclofen and Valium the parents have noted the patient has had increased spasming they are having the baclofen pump interrogated at [facility] the patient has had no fever he has had an occasional cough he does have a history of urinary tract infections as a have to straight cath him because of her neurogenic bladder there is been no reports of vomiting or diarrhea no fever reported      REVIEW OF SYSTEMS    (2-9 systems for level 4, 10 or more for level 5)     Review of Systems   Constitutional: Negative for chills and fever. Respiratory: Positive for cough. Negative for shortness of breath. Gastrointestinal: Negative for diarrhea and vomiting. Musculoskeletal:        Increase spasming sensation   Skin: Negative. PAST MEDICAL HISTORY    has a past medical history of Cerebral palsy ([address] and Seizures ([address] SURGICAL HISTORY      has a past surgical history that includes back surgery; baclofen pump implantation; hip surgery; and Gastric fundoplication. CURRENTMEDICATIONS       Previous Medications    ALBUTEROL SULFATE  (90 BASE) MCG/ACT INHALER    Inhale 2 puffs into the lungs every 4 hours as needed    BACLOFEN, PAIN PUMP REFILL CHARGE,        CATHETERS MISC    16 Fr.  Latex free, coude tip intermittent urinary catheters. Use as directed 4 times daily. CLONAZEPAM (KLONOPIN) 0.5 MG TABLET    0.5 mg. 0.5 am and 0.5noon  (2 )0.5 mg at night    DIAZEPAM (VALIUM) 10 MG TABLET    Take 5 mg by mouth as needed. DIVALPROEX (DEPAKOTE) 125 MG DR TABLET    Take one tab at bedtime (on weaning down trial)    FEXOFENADINE HCL (MUCINEX ALLERGY PO)    Take by mouth    FLUTICASONE PROPIONATE (FLONASE NA)    by Nasal route    GABAPENTIN (NEURONTIN) 100 MG CAPSULE    Take 100 mg by mouth 3 times daily. GLYCOPYRROLATE (CUVPOSA) 1 MG/5ML SOLN SOLUTION    Take 900 mcg by mouth 3 times daily    LEVETIRACETAM (KEPPRA) 100 MG/ML SOLUTION    Take 7.5 ml twice daily    LORATADINE (CLARITIN) 10 MG TABLET    Take 10 mg by mouth daily    MELATONIN 10 MG TABS    Take 10 mg by mouth nightly    MONTELUKAST (SINGULAIR) 10 MG TABLET    Take 10 mg by mouth nightly    NITROFURANTOIN, MACROCRYSTAL-MONOHYDRATE, (MACROBID) 100 MG CAPSULE    Take 1 capsule by mouth daily    OMEPRAZOLE (PRILOSEC PO)    Take by mouth    POLYETHYLENE GLYCOL (GLYCOLAX) 17 GM/SCOOP POWDER    Take 17 g by mouth daily    TRIHEXYPHENIDYL (ARTANE) 2 MG TABLET    Take 4 mg by mouth 3 times daily       ALLERGIES     is allergic to sulfa antibiotics and vancomycin. FAMILY HISTORY     has no family status information on file. family history is not on file. SOCIAL HISTORY      reports that he has never smoked. He has never used smokeless tobacco. He reports that he does not drink alcohol and does not use drugs. PHYSICAL EXAM    (up to 7 for level 4, 8 or more for level 5)   INITIAL VITALS:  height is 5' 2\" (1.575 m) and weight is 63.5 kg (140 lb). His temporal temperature is 98.3 °F (36.8 °C). His blood pressure is 134/90 (abnormal) and his pulse is 80. His respiration is 16 and oxygen saturation is 97%. Physical Exam  Vitals and nursing note reviewed.    Constitutional:       Comments: Patient is wheelchair-bound secondary to his cerebral palsy Per parents he is at his baseline status with the exception of the increased spasming sensation they have noted over the last few days   HENT:      Head: Normocephalic and atraumatic. Eyes:      Conjunctiva/sclera: Conjunctivae normal.   Cardiovascular:      Rate and Rhythm: Normal rate and regular rhythm. Pulmonary:      Effort: Pulmonary effort is normal.      Breath sounds: Normal breath sounds. Abdominal:      General: There is no distension. Palpations: Abdomen is soft. Tenderness: There is no abdominal tenderness. There is no right CVA tenderness, left CVA tenderness or guarding. Musculoskeletal:      Cervical back: Normal range of motion and neck supple. No rigidity or tenderness. Comments: Patient is wheelchair-bound with contractures secondary to his cerebral palsy per parents there are no new focal deficits appreciated   Lymphadenopathy:      Cervical: No cervical adenopathy. Skin:     General: Skin is warm and dry. Neurological:      Mental Status: He is alert. Comments: Patient per parents is at his baseline mental status         DIFFERENTIAL DIAGNOSIS/ MDM:     We will check basic labs a chest x-ray and a UA    DIAGNOSTIC RESULTS         RADIOLOGY:        Interpretation per the Radiologist below, if available at the time of this note:    XR CHEST PORTABLE (Final result)  Result time 04/25/22 11:24:54  Final result by David Westbrook MD (04/25/22 11:24:54)                Impression:    No acute cardiopulmonary process             Narrative:    EXAMINATION:   ONE XRAY VIEW OF THE CHEST     4/25/2022 11:07 am     COMPARISON:   April 8, 2022     HISTORY:   ORDERING SYSTEM PROVIDED HISTORY: cough   TECHNOLOGIST PROVIDED HISTORY:   cough   Reason for Exam: cough with congestion     FINDINGS:   Again demonstrated is the elevation of the right hemidiaphragm and diffuse   gaseous distension of the bowel.      Stable cardiomediastinal silhouette.  No significant pulmonary edema.  No   convincing lung consolidation or infiltrate.  No pleural effusion or   pneumothorax. Extensive thoracolumbar spinal hardware.                    LABS:  Results for orders placed or performed during the hospital encounter of 80/24/24   Basic Metabolic Panel   Result Value Ref Range    Glucose 86 70 - 99 mg/dL    BUN 14 6 - 20 mg/dL    CREATININE <0.40 (L) 0.70 - 1.20 mg/dL    Calcium 9.5 8.6 - 10.4 mg/dL    Sodium 142 135 - 144 mmol/L    Potassium 3.9 3.7 - 5.3 mmol/L    Chloride 104 98 - 107 mmol/L    CO2 25 20 - 31 mmol/L    Anion Gap 13 9 - 17 mmol/L    GFR Non-African American Can not be calculated >60 mL/min    GFR  Can not be calculated >60 mL/min    GFR Comment         CBC with Auto Differential   Result Value Ref Range    WBC 9.9 4.5 - 13.5 k/uL    RBC 4.88 4.5 - 5.9 m/uL    Hemoglobin 14.3 13.5 - 17.5 g/dL    Hematocrit 41.7 41 - 53 %    MCV 85.4 80 - 100 fL    MCH 29.3 26 - 34 pg    MCHC 34.4 31 - 37 g/dL    RDW 13.7 12.5 - 15.4 %    Platelets 467 343 - 074 k/uL    MPV 8.0 6.0 - 12.0 fL    Seg Neutrophils 56 34 - 64 %    Lymphocytes 34 25 - 45 %    Monocytes 8 2 - 8 %    Eosinophils % 1 1 - 4 %    Basophils 1 0 - 2 %    Segs Absolute 5.50 1.8 - 8.0 k/uL    Absolute Lymph # 3.40 1.2 - 5.2 k/uL    Absolute Mono # 0.80 0.1 - 1.4 k/uL    Absolute Eos # 0.10 0.0 - 0.4 k/uL    Basophils Absolute 0.10 0.0 - 0.2 k/uL   Hepatic Function Panel   Result Value Ref Range    Albumin 4.1 3.5 - 5.2 g/dL    Alkaline Phosphatase 81 40 - 129 U/L    ALT 30 5 - 41 U/L    AST 17 <40 U/L    Total Bilirubin 0.36 0.3 - 1.2 mg/dL    Bilirubin, Direct 0.10 <0.31 mg/dL    Bilirubin, Indirect 0.26 0.00 - 1.00 mg/dL    Total Protein 6.8 6.4 - 8.3 g/dL    Albumin/Globulin Ratio 1.5 1.0 - 2.5   Urinalysis with Reflex to Culture   Result Value Ref Range    Color, UA Yellow Yellow    Turbidity UA Clear Clear    Glucose, Ur NEGATIVE NEGATIVE    Bilirubin Urine NEGATIVE NEGATIVE    Ketones, Urine TRACE (A) NEGATIVE    Specific Gravity, UA 1.025 1.005 - 1.030    Urine Hgb NEGATIVE NEGATIVE    pH, UA 6.0 5.0 - 8.0    Protein, UA NEGATIVE NEGATIVE    Urobilinogen, Urine Normal Normal    Nitrite, Urine NEGATIVE NEGATIVE    Leukocyte Esterase, Urine SMALL (A) NEGATIVE   Valproic Acid Level, Total   Result Value Ref Range    Valproic Acid Lvl 16 (L) 50 - 125 ug/mL    Valproic Dose amount UNKNOWN     Valproic Date last dose UNKNOWN     Valproic Time last dose UNKNOWN    Microscopic Urinalysis   Result Value Ref Range    -          WBC, UA 20 TO 50 0 - 5 /HPF    RBC, UA 2 TO 5 0 - 2 /HPF    Epithelial Cells UA 5 TO 10 0 - 5 /HPF    Bacteria, UA FEW (A) None    Mucus, UA 1+ (A) None    Other Observations UA Culture ordered based on defined criteria. (A) NOT REQ. EMERGENCY DEPARTMENT COURSE:   Vitals:    Vitals:    04/25/22 1008   BP: (!) 134/90   Pulse: 80   Resp: 16   Temp: 98.3 °F (36.8 °C)   TempSrc: Temporal   SpO2: 97%   Weight: 63.5 kg (140 lb)   Height: 5' 2\" (1.575 m)     -------------------------  BP: (!) 134/90, Temp: 98.3 °F (36.8 °C), Pulse: 80, Resp: 16      RE-EVALUATION:  Patient presents with cerebral palsy has had increasing spasms has a baclofen pump they [facility] [employer] [facility] about interrogation of the pump the patient has had no spasms while here in the emergency department he is at his baseline status lab work chest x-ray all unremarkable given this I do feel he can follow this up as an outpatient  Patient is noted to have a urinary tract infection family states that he is on Macrobid chronically so I will go ahead and write a prescription for Keflex    PROCEDURES:  None    FINAL IMPRESSION      1. Spasm of muscle    2.  Urinary tract infection without hematuria, site unspecified          DISPOSITION/PLAN   DISPOSITION        CONDITION ON DISPOSITION:   Stable    PATIENT REFERRED TO:  [address], DO  [address] [address]  [phone]    Call in 1 day        DISCHARGE MEDICATIONS:  New Prescriptions    CEPHALEXIN (KEFLEX) 250 MG/5ML SUSPENSION    Take 10 mLs by mouth 3 times daily for 10 days       (Please note that portions of this note were completed with a voicerecognition program.  Efforts were made to edit the dictations but occasionally words are mis-transcribed.)    Raven Salazar MD,, MD, F.A.C.E.P.   Attending Emergency Medicine Physician       Raven Salazar MD  04/25/22 4221

## 2022-04-28 LAB
CULTURE: NORMAL
SPECIMEN DESCRIPTION: NORMAL

## 2022-05-20 ENCOUNTER — HOSPITAL ENCOUNTER (INPATIENT)
Age: 22
LOS: 2 days | Discharge: HOME | DRG: 466 | End: 2022-05-22
Attending: EMERGENCY MEDICINE | Admitting: HOSPITALIST
Payer: MEDICAID

## 2022-05-20 ENCOUNTER — APPOINTMENT (OUTPATIENT)
Dept: CT IMAGING | Age: 22
DRG: 466 | End: 2022-05-20
Payer: MEDICAID

## 2022-05-20 DIAGNOSIS — E86.0 DEHYDRATION: ICD-10-CM

## 2022-05-20 DIAGNOSIS — K56.7 ILEUS (HCC): Primary | ICD-10-CM

## 2022-05-20 PROBLEM — N30.00 ACUTE CYSTITIS WITHOUT HEMATURIA: Status: ACTIVE | Noted: 2022-05-20

## 2022-05-20 LAB
ABSOLUTE EOS #: 0.15 K/UL (ref 0–0.4)
ABSOLUTE LYMPH #: 2.07 K/UL (ref 1.2–5.2)
ABSOLUTE MONO #: 2.07 K/UL (ref 0.1–1.4)
ALBUMIN SERPL-MCNC: 4.6 G/DL (ref 3.5–5.2)
ALBUMIN/GLOBULIN RATIO: 1.3 (ref 1–2.5)
ALP BLD-CCNC: 93 U/L (ref 40–129)
ALT SERPL-CCNC: 25 U/L (ref 5–41)
ANION GAP SERPL CALCULATED.3IONS-SCNC: 16 MMOL/L (ref 9–17)
AST SERPL-CCNC: 22 U/L
BASOPHILS # BLD: 0 % (ref 0–2)
BASOPHILS ABSOLUTE: 0 K/UL (ref 0–0.2)
BILIRUB SERPL-MCNC: 0.59 MG/DL (ref 0.3–1.2)
BILIRUBIN DIRECT: 0.12 MG/DL
BILIRUBIN, INDIRECT: 0.47 MG/DL (ref 0–1)
BUN BLDV-MCNC: 35 MG/DL (ref 6–20)
CALCIUM SERPL-MCNC: 10.3 MG/DL (ref 8.6–10.4)
CHLORIDE BLD-SCNC: 93 MMOL/L (ref 98–107)
CO2: 25 MMOL/L (ref 20–31)
CREAT SERPL-MCNC: 0.6 MG/DL (ref 0.7–1.2)
EOSINOPHILS RELATIVE PERCENT: 1 % (ref 1–4)
GFR NON-AFRICAN AMERICAN: ABNORMAL ML/MIN
GFR SERPL CREATININE-BSD FRML MDRD: ABNORMAL ML/MIN/{1.73_M2}
GLUCOSE BLD-MCNC: 110 MG/DL (ref 70–99)
HCT VFR BLD CALC: 51.7 % (ref 41–53)
HEMOGLOBIN: 17.7 G/DL (ref 13.5–17.5)
LACTIC ACID, SEPSIS: 1.4 MMOL/L (ref 0.5–1.9)
LIPASE: 9 U/L (ref 13–60)
LYMPHOCYTES # BLD: 14 % (ref 25–45)
MCH RBC QN AUTO: 29.1 PG (ref 26–34)
MCHC RBC AUTO-ENTMCNC: 34.2 G/DL (ref 31–37)
MCV RBC AUTO: 85.1 FL (ref 80–100)
MONOCYTES # BLD: 14 % (ref 2–8)
MORPHOLOGY: NORMAL
PDW BLD-RTO: 13.4 % (ref 12.5–15.4)
PLATELET # BLD: 323 K/UL (ref 140–450)
PMV BLD AUTO: 8.7 FL (ref 6–12)
POTASSIUM SERPL-SCNC: 4.6 MMOL/L (ref 3.7–5.3)
RBC # BLD: 6.07 M/UL (ref 4.5–5.9)
SARS-COV-2, RAPID: NOT DETECTED
SEG NEUTROPHILS: 71 % (ref 34–64)
SEGMENTED NEUTROPHILS ABSOLUTE COUNT: 10.51 K/UL (ref 1.8–8)
SODIUM BLD-SCNC: 134 MMOL/L (ref 135–144)
SPECIMEN DESCRIPTION: NORMAL
TOTAL PROTEIN: 8.1 G/DL (ref 6.4–8.3)
WBC # BLD: 14.8 K/UL (ref 4.5–13.5)

## 2022-05-20 PROCEDURE — 2580000003 HC RX 258: Performed by: HOSPITALIST

## 2022-05-20 PROCEDURE — 74177 CT ABD & PELVIS W/CONTRAST: CPT

## 2022-05-20 PROCEDURE — 96374 THER/PROPH/DIAG INJ IV PUSH: CPT

## 2022-05-20 PROCEDURE — 81001 URINALYSIS AUTO W/SCOPE: CPT

## 2022-05-20 PROCEDURE — 36415 COLL VENOUS BLD VENIPUNCTURE: CPT

## 2022-05-20 PROCEDURE — 87635 SARS-COV-2 COVID-19 AMP PRB: CPT

## 2022-05-20 PROCEDURE — 83690 ASSAY OF LIPASE: CPT

## 2022-05-20 PROCEDURE — 87086 URINE CULTURE/COLONY COUNT: CPT

## 2022-05-20 PROCEDURE — 99223 1ST HOSP IP/OBS HIGH 75: CPT | Performed by: HOSPITALIST

## 2022-05-20 PROCEDURE — 87186 SC STD MICRODIL/AGAR DIL: CPT

## 2022-05-20 PROCEDURE — 2580000003 HC RX 258: Performed by: EMERGENCY MEDICINE

## 2022-05-20 PROCEDURE — 85025 COMPLETE CBC W/AUTO DIFF WBC: CPT

## 2022-05-20 PROCEDURE — 6360000002 HC RX W HCPCS: Performed by: EMERGENCY MEDICINE

## 2022-05-20 PROCEDURE — 87088 URINE BACTERIA CULTURE: CPT

## 2022-05-20 PROCEDURE — 99285 EMERGENCY DEPT VISIT HI MDM: CPT

## 2022-05-20 PROCEDURE — 1210000000 HC MED SURG R&B

## 2022-05-20 PROCEDURE — 96361 HYDRATE IV INFUSION ADD-ON: CPT

## 2022-05-20 PROCEDURE — 6370000000 HC RX 637 (ALT 250 FOR IP): Performed by: HOSPITALIST

## 2022-05-20 PROCEDURE — 6360000004 HC RX CONTRAST MEDICATION: Performed by: EMERGENCY MEDICINE

## 2022-05-20 PROCEDURE — 83605 ASSAY OF LACTIC ACID: CPT

## 2022-05-20 PROCEDURE — 80048 BASIC METABOLIC PNL TOTAL CA: CPT

## 2022-05-20 PROCEDURE — 80076 HEPATIC FUNCTION PANEL: CPT

## 2022-05-20 RX ORDER — LEVETIRACETAM 100 MG/ML
750 SOLUTION ORAL 2 TIMES DAILY
Status: DISCONTINUED | OUTPATIENT
Start: 2022-05-20 | End: 2022-05-22 | Stop reason: HOSPADM

## 2022-05-20 RX ORDER — SODIUM CHLORIDE 9 MG/ML
INJECTION, SOLUTION INTRAVENOUS PRN
Status: DISCONTINUED | OUTPATIENT
Start: 2022-05-20 | End: 2022-05-22 | Stop reason: HOSPADM

## 2022-05-20 RX ORDER — DIAZEPAM 5 MG/1
5 TABLET ORAL EVERY 6 HOURS PRN
Status: DISCONTINUED | OUTPATIENT
Start: 2022-05-20 | End: 2022-05-22 | Stop reason: HOSPADM

## 2022-05-20 RX ORDER — BISACODYL 10 MG
10 SUPPOSITORY, RECTAL RECTAL
COMMUNITY

## 2022-05-20 RX ORDER — DOCUSATE SODIUM 100 MG/1
100 CAPSULE, LIQUID FILLED ORAL 2 TIMES DAILY
Status: DISCONTINUED | OUTPATIENT
Start: 2022-05-20 | End: 2022-05-22 | Stop reason: HOSPADM

## 2022-05-20 RX ORDER — CHOLECALCIFEROL (VITAMIN D3) 125 MCG
10 CAPSULE ORAL NIGHTLY
Status: DISCONTINUED | OUTPATIENT
Start: 2022-05-20 | End: 2022-05-22 | Stop reason: HOSPADM

## 2022-05-20 RX ORDER — GABAPENTIN 100 MG/1
200 CAPSULE ORAL 3 TIMES DAILY
Status: DISCONTINUED | OUTPATIENT
Start: 2022-05-20 | End: 2022-05-22 | Stop reason: HOSPADM

## 2022-05-20 RX ORDER — SODIUM CHLORIDE 0.9 % (FLUSH) 0.9 %
10 SYRINGE (ML) INJECTION PRN
Status: DISCONTINUED | OUTPATIENT
Start: 2022-05-20 | End: 2022-05-22 | Stop reason: HOSPADM

## 2022-05-20 RX ORDER — MONTELUKAST SODIUM 10 MG/1
10 TABLET ORAL NIGHTLY
Status: DISCONTINUED | OUTPATIENT
Start: 2022-05-20 | End: 2022-05-22 | Stop reason: HOSPADM

## 2022-05-20 RX ORDER — ALBUTEROL SULFATE 90 UG/1
2 AEROSOL, METERED RESPIRATORY (INHALATION) EVERY 4 HOURS PRN
Status: DISCONTINUED | OUTPATIENT
Start: 2022-05-20 | End: 2022-05-22 | Stop reason: HOSPADM

## 2022-05-20 RX ORDER — ACETAMINOPHEN 325 MG/1
650 TABLET ORAL EVERY 6 HOURS PRN
Status: DISCONTINUED | OUTPATIENT
Start: 2022-05-20 | End: 2022-05-22 | Stop reason: HOSPADM

## 2022-05-20 RX ORDER — SODIUM CHLORIDE 9 MG/ML
INJECTION, SOLUTION INTRAVENOUS CONTINUOUS
Status: DISCONTINUED | OUTPATIENT
Start: 2022-05-20 | End: 2022-05-21

## 2022-05-20 RX ORDER — TRIHEXYPHENIDYL HYDROCHLORIDE 2 MG/1
4 TABLET ORAL 3 TIMES DAILY
Status: DISCONTINUED | OUTPATIENT
Start: 2022-05-20 | End: 2022-05-22 | Stop reason: HOSPADM

## 2022-05-20 RX ORDER — POTASSIUM CHLORIDE 7.45 MG/ML
10 INJECTION INTRAVENOUS PRN
Status: DISCONTINUED | OUTPATIENT
Start: 2022-05-20 | End: 2022-05-22 | Stop reason: HOSPADM

## 2022-05-20 RX ORDER — POLYETHYLENE GLYCOL 3350 17 G/17G
17 POWDER, FOR SOLUTION ORAL DAILY PRN
Status: DISCONTINUED | OUTPATIENT
Start: 2022-05-20 | End: 2022-05-22 | Stop reason: HOSPADM

## 2022-05-20 RX ORDER — MAGNESIUM SULFATE 1 G/100ML
1000 INJECTION INTRAVENOUS PRN
Status: DISCONTINUED | OUTPATIENT
Start: 2022-05-20 | End: 2022-05-22 | Stop reason: HOSPADM

## 2022-05-20 RX ORDER — PSEUDOEPHEDRINE HCL 30 MG
100 TABLET ORAL 3 TIMES DAILY PRN
COMMUNITY

## 2022-05-20 RX ORDER — SODIUM CHLORIDE, SODIUM LACTATE, POTASSIUM CHLORIDE, AND CALCIUM CHLORIDE .6; .31; .03; .02 G/100ML; G/100ML; G/100ML; G/100ML
1000 INJECTION, SOLUTION INTRAVENOUS ONCE
Status: COMPLETED | OUTPATIENT
Start: 2022-05-20 | End: 2022-05-20

## 2022-05-20 RX ORDER — CETIRIZINE HYDROCHLORIDE 10 MG/1
10 TABLET ORAL DAILY
Status: DISCONTINUED | OUTPATIENT
Start: 2022-05-20 | End: 2022-05-22 | Stop reason: HOSPADM

## 2022-05-20 RX ORDER — ENOXAPARIN SODIUM 100 MG/ML
40 INJECTION SUBCUTANEOUS DAILY
Status: DISCONTINUED | OUTPATIENT
Start: 2022-05-20 | End: 2022-05-22 | Stop reason: HOSPADM

## 2022-05-20 RX ORDER — CLONAZEPAM 0.5 MG/1
1 TABLET ORAL 3 TIMES DAILY
Status: DISCONTINUED | OUTPATIENT
Start: 2022-05-20 | End: 2022-05-22 | Stop reason: HOSPADM

## 2022-05-20 RX ORDER — POTASSIUM CHLORIDE 20 MEQ/1
40 TABLET, EXTENDED RELEASE ORAL PRN
Status: DISCONTINUED | OUTPATIENT
Start: 2022-05-20 | End: 2022-05-22 | Stop reason: HOSPADM

## 2022-05-20 RX ORDER — ONDANSETRON 4 MG/1
4 TABLET, ORALLY DISINTEGRATING ORAL EVERY 8 HOURS PRN
Status: DISCONTINUED | OUTPATIENT
Start: 2022-05-20 | End: 2022-05-22 | Stop reason: HOSPADM

## 2022-05-20 RX ORDER — SODIUM CHLORIDE 0.9 % (FLUSH) 0.9 %
5-40 SYRINGE (ML) INJECTION EVERY 12 HOURS SCHEDULED
Status: DISCONTINUED | OUTPATIENT
Start: 2022-05-20 | End: 2022-05-22 | Stop reason: HOSPADM

## 2022-05-20 RX ORDER — DIVALPROEX SODIUM 125 MG/1
125 TABLET, DELAYED RELEASE ORAL NIGHTLY
Status: DISCONTINUED | OUTPATIENT
Start: 2022-05-20 | End: 2022-05-22 | Stop reason: HOSPADM

## 2022-05-20 RX ORDER — PANTOPRAZOLE SODIUM 40 MG/1
40 TABLET, DELAYED RELEASE ORAL
Status: DISCONTINUED | OUTPATIENT
Start: 2022-05-21 | End: 2022-05-22 | Stop reason: HOSPADM

## 2022-05-20 RX ORDER — BISACODYL 10 MG
10 SUPPOSITORY, RECTAL RECTAL DAILY
Status: DISCONTINUED | OUTPATIENT
Start: 2022-05-20 | End: 2022-05-22 | Stop reason: HOSPADM

## 2022-05-20 RX ORDER — ONDANSETRON 2 MG/ML
4 INJECTION INTRAMUSCULAR; INTRAVENOUS EVERY 6 HOURS PRN
Status: DISCONTINUED | OUTPATIENT
Start: 2022-05-20 | End: 2022-05-22 | Stop reason: HOSPADM

## 2022-05-20 RX ORDER — ACETAMINOPHEN 650 MG/1
650 SUPPOSITORY RECTAL EVERY 6 HOURS PRN
Status: DISCONTINUED | OUTPATIENT
Start: 2022-05-20 | End: 2022-05-22 | Stop reason: HOSPADM

## 2022-05-20 RX ORDER — 0.9 % SODIUM CHLORIDE 0.9 %
80 INTRAVENOUS SOLUTION INTRAVENOUS ONCE
Status: COMPLETED | OUTPATIENT
Start: 2022-05-20 | End: 2022-05-20

## 2022-05-20 RX ADMIN — Medication 10 MG: at 20:42

## 2022-05-20 RX ADMIN — SODIUM CHLORIDE, PRESERVATIVE FREE 10 ML: 5 INJECTION INTRAVENOUS at 20:41

## 2022-05-20 RX ADMIN — SODIUM CHLORIDE, POTASSIUM CHLORIDE, SODIUM LACTATE AND CALCIUM CHLORIDE 1000 ML: 600; 310; 30; 20 INJECTION, SOLUTION INTRAVENOUS at 11:04

## 2022-05-20 RX ADMIN — CLONAZEPAM 1 MG: 0.5 TABLET ORAL at 20:42

## 2022-05-20 RX ADMIN — SODIUM CHLORIDE: 9 INJECTION, SOLUTION INTRAVENOUS at 13:39

## 2022-05-20 RX ADMIN — GABAPENTIN 200 MG: 100 CAPSULE ORAL at 20:42

## 2022-05-20 RX ADMIN — GABAPENTIN 200 MG: 100 CAPSULE ORAL at 14:48

## 2022-05-20 RX ADMIN — SODIUM CHLORIDE, PRESERVATIVE FREE 10 ML: 5 INJECTION INTRAVENOUS at 11:56

## 2022-05-20 RX ADMIN — IOPAMIDOL 75 ML: 755 INJECTION, SOLUTION INTRAVENOUS at 11:58

## 2022-05-20 RX ADMIN — LEVETIRACETAM 750 MG: 500 SOLUTION ORAL at 20:47

## 2022-05-20 RX ADMIN — MONTELUKAST 10 MG: 10 TABLET, FILM COATED ORAL at 20:42

## 2022-05-20 RX ADMIN — DOCUSATE SODIUM 100 MG: 100 CAPSULE, LIQUID FILLED ORAL at 20:42

## 2022-05-20 RX ADMIN — SODIUM CHLORIDE 80 ML: 9 INJECTION, SOLUTION INTRAVENOUS at 11:57

## 2022-05-20 RX ADMIN — DIVALPROEX SODIUM 125 MG: 125 TABLET, DELAYED RELEASE ORAL at 20:42

## 2022-05-20 RX ADMIN — SODIUM CHLORIDE: 9 INJECTION, SOLUTION INTRAVENOUS at 15:20

## 2022-05-20 RX ADMIN — CEFTRIAXONE SODIUM 1000 MG: 1 INJECTION, POWDER, FOR SOLUTION INTRAMUSCULAR; INTRAVENOUS at 13:40

## 2022-05-20 RX ADMIN — CLONAZEPAM 1 MG: 0.5 TABLET ORAL at 14:48

## 2022-05-20 ASSESSMENT — PAIN SCALES - GENERAL
PAINLEVEL_OUTOF10: 3
PAINLEVEL_OUTOF10: 5

## 2022-05-20 ASSESSMENT — PAIN DESCRIPTION - PAIN TYPE: TYPE: ACUTE PAIN

## 2022-05-20 ASSESSMENT — ENCOUNTER SYMPTOMS
VOMITING: 1
BACK PAIN: 1
NAUSEA: 1
ABDOMINAL DISTENTION: 1
COUGH: 0
ABDOMINAL PAIN: 1
SHORTNESS OF BREATH: 0
RHINORRHEA: 0
SORE THROAT: 0

## 2022-05-20 ASSESSMENT — PAIN DESCRIPTION - ORIENTATION: ORIENTATION: LOWER

## 2022-05-20 ASSESSMENT — PAIN - FUNCTIONAL ASSESSMENT: PAIN_FUNCTIONAL_ASSESSMENT: ADULT NONVERBAL PAIN SCALE (NPVS)

## 2022-05-20 ASSESSMENT — PAIN DESCRIPTION - LOCATION: LOCATION: ABDOMEN

## 2022-05-20 NOTE — ED TRIAGE NOTES
Patient arrives with c/o abdominal pain, vomiting, and fever. State fever began Saturday and began to have nausea and vomiting the last 2 days. Last BM Sunday, decreased appetite and hasn't eaten. Has not had tube feed since Wednesday evening.

## 2022-05-20 NOTE — PROGRESS NOTES
Called pharmacy regarding medication Artane at this time, pharmacy states we do not carry this medication here and it will be brought over from [facility]. Isi Lkae states medication should be delivered by 6 pm. Family updated, verbalized understanding.

## 2022-05-20 NOTE — H&P
[facility]  Office: [address], DO, Ritika Joy, DO, Rosa aMya, DO, Sandie Baerl Blood, DO, Lizette Jaffe MD, Andres Bobo MD, Darryle So, MD, Fozia Briones MD, Moris Troncoso MD, Aj Choi MD, Deandra Salguero MD, Sammi Dye, DO, Alek Zavala DO, Essie Smith MD,  Shilpi Carlos DO, Latonia Thomas MD, Taj Fisher MD, Paul Westbrook MD, Erick Peacock DO, Joseluis Virgen MD, Enrique Kaminski MD, Kenrick Nichols MD, Beverly Ma, [facility], [facility], CNP, Oswaldo Day, CNP, Silviano Chaudhry, CNP, Rjaan Peacock, CNP, Hollie Adorno, CNP, Bridgett Bullock PA-C, Thad Monaco, [facility], Corby Kerr, CNP, J Luis Damon, CNP, Varun Montelongo, CNP, Natanael Santiago, CNS, Jimmie Ibarra, [facility], Gloria Last, CNP, Lupe Farias, CNP, Sakshi Fofana, [facility]    HISTORY AND PHYSICAL EXAMINATION            Date:   5/20/2022  Patient name:  Mia Hunter  Date of admission:  5/20/2022 10:14 AM  MRN:   [id number]  Account:  [de-identified]  YOB: 2000  PCP:    Bina Dowling DO  Room:   [address] [place] [address]-  Code Status:    Full Code    Chief Complaint:     Chief Complaint   Patient presents with    Abdominal Pain     Patient brought to the hospital by parents secondary to abdominal pain, nausea, vomiting, chief complaint unobtainable secondary to patient cerebral palsy/nonverbal state. History Obtained From:     mother, father, electronic medical record    History of Present Illness:     Mia Hunter is a 25 y.o. Non- / non  male who presents with Abdominal Pain   and is admitted to the hospital for the management of Ileus ([facility] [address] This very pleasant 44-year-old male was brought to the hospital by his parents due to abdominal pain, nausea, vomiting. Imaging studies have shown a ileus.   Clinically it appears that he has a acute cystitis without hematuria which is the most likely etiology behind his ileus. The patient has cerebral palsy and spastic paraplegia. He does require self-catheterization and appears to have a urinary tract infection secondary to self-catheterization. Ileus is likely secondary to self-catheterization. At this point in time the patient has been initiated on Rocephin along with IV fluids. Antiemetics have helped with his nausea. He still has some mild abdominal pain. He is relatively nonverbal but does smile and answer very simple questions. Family at the bedside provide majority of his history. Past Medical History:     Past Medical History:   Diagnosis Date    C[address]bral [address] ([facility] [address]     Seizures ([facility] [address]         Past Surgical History:     Past Surgical History:   Procedure Laterality Date    BACK SURGERY      BACLOFEN PUMP IMPLANTATION      GASTRIC FUNDOPLICATION      HIP SURGERY          Medications Prior to Admission:     Prior to Admission medications    Medication Sig Start Date End Date Taking? Authorizing Provider   bisacodyl (DULCOLAX) 10 MG suppository Place 10 mg rectally    Historical Provider, MD   docusate (COLACE, DULCOLAX) 100 MG CAPS Take 100 mg by mouth 3 times daily as needed    Historical Provider, MD   Sennosides 15 MG TABS Take 1 tablet by mouth daily    Historical Provider, MD   Fluticasone Propionate (FLONASE NA) by Nasal route    Historical Provider, MD   Fexofenadine HCl (MUCINEX ALLERGY PO) Take by mouth    Historical Provider, MD   levETIRAcetam (KEPPRA) 100 MG/ML solution Take 7.5 ml twice daily 3/23/22   Richard Álvarez MD   divalproex (DEPAKOTE) 125 MG DR tablet Take one tab at bedtime (on weaning down trial) 3/23/22   Richard Álvarez MD   nitrofurantoin, macrocrystal-monohydrate, (MACROBID) 100 MG capsule Take 1 capsule by mouth daily 11/17/21 3/23/22  AIMEE Butts - CNP   diazePAM (VALIUM) 10 MG tablet Take 5 mg by mouth as needed.     Historical Provider, MD   clonazePAM (KLONOPIN) 0.5 MG tablet 1 mg 3 times daily. 0.5 am and 0.5noon  (2 )0.5 mg at night 7/2/20 3/23/22  Historical Provider, MD   glycopyrrolate (CUVPOSA) 1 MG/5ML SOLN solution Take 900 mcg by mouth 3 times daily  Patient not taking: Reported on 11/3/2021 10/2/20 11/3/21  Historical Provider, MD   gabapentin (NEURONTIN) 100 MG capsule Take 100 mg by mouth 3 times daily. 200mg Morning and evening  100mg afternoon 12/23/19   Historical Provider, MD   albuterol sulfate  (90 Base) MCG/ACT inhaler Inhale 2 puffs into the lungs every 4 hours as needed 12/11/18   Historical Provider, MD   polyethylene glycol (GLYCOLAX) 17 GM/SCOOP powder Take 17 g by mouth daily  Patient not taking: Reported on 5/20/2022    Historical Provider, MD   Omeprazole (PRILOSEC PO) Take by mouth    Historical Provider, MD   Catheters MISC 16 Fr. Latex free, coude tip intermittent urinary catheters. Use as directed 4 times daily. 9/28/18   AIMEE Shafer - CNP   trihexyphenidyl (ARTANE) 2 MG tablet Take 4 mg by mouth 3 times daily    Historical Provider, MD   loratadine (CLARITIN) 10 MG tablet Take 10 mg by mouth daily    Historical Provider, MD   montelukast (SINGULAIR) 10 MG tablet Take 10 mg by mouth nightly    Historical Provider, MD   Melatonin 10 MG TABS Take 10 mg by mouth nightly    Historical Provider, MD   BACLOFEN, PAIN PUMP REFILL CHARGE,     Historical Provider, MD        Allergies:     Sulfa antibiotics and Vancomycin    Social History:     Tobacco:    reports that he has never smoked. He has never used smokeless tobacco.  Alcohol:      reports no history of alcohol use. Drug Use:  reports no history of drug use. Family History:     History reviewed. No pertinent family history.     Review of Systems:     Unobtainable secondary to patient's cerebral palsy/nonverbal state    Physical Exam:   BP (!) 141/81   Pulse 105   Temp 99.1 °F (37.3 °C) (Temporal)   Resp 18   Ht 5' 2\" (1.575 m)   Wt 131 lb (59.4 kg)   SpO2 92%   BMI 23.96 kg/m²   Temp (24hrs), Av.5 °F (36.9 °C), Min:97.9 °F (36.6 °C), Max:99.1 °F (37.3 °C)    No results for input(s): POCGLU in the last 72 hours.     Intake/Output Summary (Last 24 hours) at 2022 1654  Last data filed at 2022 1149  Gross per 24 hour   Intake 1000 ml   Output --   Net 1000 ml       General Appearance: Awake, interactive with family at the bedside, no acute distress  Mental status: Patient awake and interactive, at baseline function  Head: normocephalic, atraumatic  Eye: Disconjugate gaze, pupils reactive, conjunctive a clear  Ear: normal external ear, no discharge, hearing intact  Nose: no drainage noted  Mouth: mucous membranes moist  Neck: supple, no carotid bruits, thyroid not palpable  Lungs: Bilateral equal air entry, clear to ausculation, no wheezing, rales or rhonchi, normal effort  Cardiovascular: normal rate, regular rhythm, no murmur, gallop, rub  Abdomen: Soft, bowel sounds hypoactive, mild diffuse tenderness, slightly distended, G-tube noted  Neurologic: There are no new focal motor or sensory deficits, normal muscle tone and bulk, no abnormal sensation, normal speech, cranial nerves II through XII grossly intact  Skin: No gross lesions, rashes, bruising or bleeding on exposed skin area  Extremities: Chronic muscle wasting and contractures of the lower extremities noted, upper extremity contractures noted  Psych: Patient at baseline function per family    Investigations:      Laboratory Testing:  Recent Results (from the past 24 hour(s))   CBC with Auto Differential    Collection Time: 22 10:45 AM   Result Value Ref Range    WBC 14.8 (H) 4.5 - 13.5 k/uL    RBC 6.07 (H) 4.5 - 5.9 m/uL    Hemoglobin 17.7 (H) 13.5 - 17.5 g/dL    Hematocrit 51.7 41 - 53 %    MCV 85.1 80 - 100 fL    MCH 29.1 26 - 34 pg    MCHC 34.2 31 - 37 g/dL    RDW 13.4 12.5 - 15.4 %    Platelets 840 838 - 651 k/uL    MPV 8.7 6.0 - 12.0 fL    Seg Neutrophils 71 (H) 34 - 64 %    Lymphocytes 14 (L) 25 - 45 %    Monocytes 14 (H) 2 - NEGATIVE    -          WBC, UA 10 TO 20 0 - 5 /HPF    RBC, UA 0 TO 2 0 - 2 /HPF    Epithelial Cells UA 2 TO 5 0 - 5 /HPF    Bacteria, UA FEW (A) None   COVID-19, Rapid    Collection Time: 05/20/22 12:58 PM    Specimen: Nasopharyngeal Swab   Result Value Ref Range    Specimen Description . NASOPHARYNGEAL SWAB     SARS-CoV-2, Rapid Not Detected Not Detected       Imaging/Diagnostics:  CT ABDOMEN PELVIS W IV CONTRAST Additional Contrast? None    Result Date: 5/20/2022  Multiple loops of air and fluid-filled large and small bowel with air in the rectum. This appears compatible with a generalized ileus. Assessment :      Hospital Problems           Last Modified POA    * (Principal) Ileus ([facility] [address] 5/20/2022 Yes    Acute cystitis without hematuria 5/20/2022 Yes    Spastic quadriplegic cerebral palsy ([address] 5/20/2022 Yes    Seizure disorder ([facility] [address] 5/20/2022 Yes    Scoliosis 5/20/2022 Yes          Plan:     Patient status inpatient in the Med/Surge    1. Ileus  1. Okay for clear liquids  2. Antiemetics  3. IV fluids  4. Likely secondary to acute urinary tract infection  2. Acute cystitis without hematuria  1. Secondary to self-catheterization  2. Continue Rocephin  3. Follow cultures  3. Seizure disorder  1. Home medications resumed  4. Cerebral palsy/spastic quadriplegia  1. Supportive care  2. Continue home medications  5. Neurogenic bladder  1. Catheterization as needed    Consultations:   None    Patient is admitted as inpatient status because of co-morbidities listed above, severity of signs and symptoms as outlined, requirement for current medical therapies and most importantly because of direct risk to patient if care not provided in a hospital setting. Expected length of stay > 48 hours.     Sushma Brown DO  5/20/2022  4:54 PM    Copy sent to Dr. Nicolas Aleman DO

## 2022-05-20 NOTE — ED PROVIDER NOTES
Musculoskeletal: Positive for back pain. Negative for neck pain. Skin: Negative for rash and wound. Neurological: Positive for headaches. Negative for dizziness and light-headedness. Hematological: Negative for adenopathy. Does not bruise/bleed easily. PAST MEDICAL HISTORY    has a past medical history of Cerebral palsy ([facility] [address] and Seizures ([facility] [address] SURGICAL HISTORY      has a past surgical history that includes back surgery; baclofen pump implantation; hip surgery; and Gastric fundoplication. CURRENTMEDICATIONS       Previous Medications    ALBUTEROL SULFATE  (90 BASE) MCG/ACT INHALER    Inhale 2 puffs into the lungs every 4 hours as needed    BACLOFEN, PAIN PUMP REFILL CHARGE,        BISACODYL (DULCOLAX) 10 MG SUPPOSITORY    Place 10 mg rectally    CATHETERS MISC    16 Fr. Latex free, coude tip intermittent urinary catheters. Use as directed 4 times daily. CLONAZEPAM (KLONOPIN) 0.5 MG TABLET    1 mg 3 times daily. 0.5 am and 0.5noon  (2 )0.5 mg at night    DIAZEPAM (VALIUM) 10 MG TABLET    Take 5 mg by mouth as needed. DIVALPROEX (DEPAKOTE) 125 MG DR TABLET    Take one tab at bedtime (on weaning down trial)    DOCUSATE (COLACE, DULCOLAX) 100 MG CAPS    Take 100 mg by mouth 3 times daily as needed    FEXOFENADINE HCL (MUCINEX ALLERGY PO)    Take by mouth    FLUTICASONE PROPIONATE (FLONASE NA)    by Nasal route    GABAPENTIN (NEURONTIN) 100 MG CAPSULE    Take 100 mg by mouth 3 times daily.  200mg Morning and evening  100mg afternoon    GLYCOPYRROLATE (CUVPOSA) 1 MG/5ML SOLN SOLUTION    Take 900 mcg by mouth 3 times daily    LEVETIRACETAM (KEPPRA) 100 MG/ML SOLUTION    Take 7.5 ml twice daily    LORATADINE (CLARITIN) 10 MG TABLET    Take 10 mg by mouth daily    MELATONIN 10 MG TABS    Take 10 mg by mouth nightly    MONTELUKAST (SINGULAIR) 10 MG TABLET    Take 10 mg by mouth nightly    NITROFURANTOIN, MACROCRYSTAL-MONOHYDRATE, (MACROBID) 100 MG CAPSULE    Take 1 capsule by mouth daily OMEPRAZOLE (PRILOSEC PO)    Take by mouth    POLYETHYLENE GLYCOL (GLYCOLAX) 17 GM/SCOOP POWDER    Take 17 g by mouth daily    SENNOSIDES 15 MG TABS    Take 1 tablet by mouth daily    TRIHEXYPHENIDYL (ARTANE) 2 MG TABLET    Take 4 mg by mouth 3 times daily       ALLERGIES     is allergic to sulfa antibiotics and vancomycin. FAMILY HISTORY     has no family status information on file. family history is not on file. SOCIAL HISTORY      reports that he has never smoked. He has never used smokeless tobacco. He reports that he does not drink alcohol and does not use drugs. PHYSICAL EXAM    (up to 7 for level 4, 8 or more for level 5)   INITIAL VITALS:  height is 5' 2\" (1.575 m) and weight is 59.4 kg (131 lb). His tympanic temperature is 97.9 °F (36.6 °C). His blood pressure is 138/85 and his pulse is 108. His respiration is 16 and oxygen saturation is 93%. Physical Exam  Vitals and nursing note reviewed. HENT:      Head: Normocephalic and atraumatic. Cardiovascular:      Rate and Rhythm: Regular rhythm. Tachycardia present. Heart sounds: Normal heart sounds. No murmur heard. No friction rub. No gallop. Pulmonary:      Effort: Pulmonary effort is normal.      Breath sounds: Normal breath sounds. No wheezing, rhonchi or rales. Abdominal:      General: Bowel sounds are normal. There is distension. Palpations: Abdomen is soft. Tenderness: There is generalized abdominal tenderness. There is no guarding or rebound. Skin:     General: Skin is warm and dry. Capillary Refill: Capillary refill takes less than 2 seconds. Neurological:      Mental Status: He is alert. 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 No new deficits. Psychiatric:         Mood and Affect: Mood normal.         Behavior: Behavior normal.         DIFFERENTIAL DIAGNOSIS/ MDM:     82-year-old male her with abdominal pain and distention. Ileus on CT abdomen and pelvis.  Not able to tolerate anything by mouth or via his G-tube at home. Probable UTI. Plan for admission for IV antibiotics and IV fluids. DIAGNOSTIC RESULTS     EKG: All EKG's are interpreted by the Emergency Department Physician who either signs or Co-signs this chart in the absence of a cardiologist.    none    RADIOLOGY:        Interpretation per the Radiologist below, if available at the time of this note:    CT ABDOMEN PELVIS W IV CONTRAST Additional Contrast? None    Result Date: 5/20/2022  EXAMINATION: CT OF THE ABDOMEN AND PELVIS WITH CONTRAST 5/20/2022 11:11 am TECHNIQUE: CT of the abdomen and pelvis was performed with the administration of intravenous contrast. Multiplanar reformatted images are provided for review. Automated exposure control, iterative reconstruction, and/or weight based adjustment of the mA/kV was utilized to reduce the radiation dose to as low as reasonably achievable. COMPARISON: None. HISTORY: ORDERING SYSTEM PROVIDED HISTORY: abdominal pain and distention TECHNOLOGIST PROVIDED HISTORY: abdominal pain and distention Decision Support Exception - unselect if not a suspected or confirmed emergency medical condition->Emergency Medical Condition (MA) Reason for Exam: Abdominal pain FINDINGS: Lower Chest: Lung bases are clear. Organs: The liver is normal.  Gallbladder is normal.  Spleen and pancreas are normal.  Adrenal glands are normal.  Kidneys are normal. GI/Bowel: There is a PEG tube with the tip in the stomach. There are distended air and fluid-filled loops of colon in a nonobstructive pattern. Some of the dilated loops of bowel appear to represent small bowel. The duodenum is not dilated. No clear transition point is seen. Pelvis: Urinary bladder is normal.  Prostate is normal.  No free fluid. Peritoneum/Retroperitoneum: The aorta is normal caliber. Numerous prominent mesenteric lymph nodes Bones/Soft Tissues: There is fusion hardware throughout the thoracic and lumbar spine and into the pelvis.   No acute complication. Multiple loops of air and fluid-filled large and small bowel with air in the rectum. This appears compatible with a generalized ileus. XR CHEST PORTABLE    Result Date: 4/25/2022  EXAMINATION: ONE XRAY VIEW OF THE CHEST 4/25/2022 11:07 am COMPARISON: April 8, 2022 HISTORY: ORDERING SYSTEM PROVIDED HISTORY: cough TECHNOLOGIST PROVIDED HISTORY: cough Reason for Exam: cough with congestion FINDINGS: Again demonstrated is the elevation of the right hemidiaphragm and diffuse gaseous distension of the bowel. Stable cardiomediastinal silhouette. No significant pulmonary edema. No convincing lung consolidation or infiltrate. No pleural effusion or pneumothorax. Extensive thoracolumbar spinal hardware. No acute cardiopulmonary process       LABS:  Results for orders placed or performed during the hospital encounter of 05/20/22   COVID-19, Rapid    Specimen: Nasopharyngeal Swab   Result Value Ref Range    Specimen Description . NASOPHARYNGEAL SWAB     SARS-CoV-2, Rapid Not Detected Not Detected   CBC with Auto Differential   Result Value Ref Range    WBC 14.8 (H) 4.5 - 13.5 k/uL    RBC 6.07 (H) 4.5 - 5.9 m/uL    Hemoglobin 17.7 (H) 13.5 - 17.5 g/dL    Hematocrit 51.7 41 - 53 %    MCV 85.1 80 - 100 fL    MCH 29.1 26 - 34 pg    MCHC 34.2 31 - 37 g/dL    RDW 13.4 12.5 - 15.4 %    Platelets 420 751 - 937 k/uL    MPV 8.7 6.0 - 12.0 fL    Seg Neutrophils 71 (H) 34 - 64 %    Lymphocytes 14 (L) 25 - 45 %    Monocytes 14 (H) 2 - 8 %    Eosinophils % 1 1 - 4 %    Basophils 0 0 - 2 %    Segs Absolute 10.51 (H) 1.8 - 8.0 k/uL    Absolute Lymph # 2.07 1.2 - 5.2 k/uL    Absolute Mono # 2.07 (H) 0.1 - 1.4 k/uL    Absolute Eos # 0.15 0.0 - 0.4 k/uL    Basophils Absolute 0.00 0.0 - 0.2 k/uL    Morphology Normal    Hepatic Function Panel   Result Value Ref Range    Albumin 4.6 3.5 - 5.2 g/dL    Alkaline Phosphatase 93 40 - 129 U/L    ALT 25 5 - 41 U/L    AST 22 <40 U/L    Total Bilirubin 0.59 0.3 - 1.2 mg/dL Bilirubin, Direct 0.12 <0.31 mg/dL    Bilirubin, Indirect 0.47 0.00 - 1.00 mg/dL    Total Protein 8.1 6.4 - 8.3 g/dL    Albumin/Globulin Ratio 1.3 1.0 - 2.5   Lipase   Result Value Ref Range    Lipase 9 (L) 13 - 60 U/L   Basic Metabolic Panel   Result Value Ref Range    Glucose 110 (H) 70 - 99 mg/dL    BUN 35 (H) 6 - 20 mg/dL    CREATININE 0.60 (L) 0.70 - 1.20 mg/dL    Calcium 10.3 8.6 - 10.4 mg/dL    Sodium 134 (L) 135 - 144 mmol/L    Potassium 4.6 3.7 - 5.3 mmol/L    Chloride 93 (L) 98 - 107 mmol/L    CO2 25 20 - 31 mmol/L    Anion Gap 16 9 - 17 mmol/L    GFR Non-African American  >60 mL/min     Pediatric GFR requires additional information. Refer to [facility] website for calculator. GFR Comment         Urinalysis with Microscopic   Result Value Ref Range    Color, UA Yellow Yellow    Turbidity UA SLIGHTLY CLOUDY (A) Clear    Glucose, Ur NEGATIVE NEGATIVE    Bilirubin Urine NEGATIVE NEGATIVE    Ketones, Urine TRACE (A) NEGATIVE    Specific Gravity, UA 1.023 1.005 - 1.030    Urine Hgb NEGATIVE NEGATIVE    pH, UA 6.0 5.0 - 8.0    Protein, UA TRACE (A) NEGATIVE    Urobilinogen, Urine Normal Normal    Nitrite, Urine NEGATIVE NEGATIVE    Leukocyte Esterase, Urine TRACE (A) NEGATIVE    -          WBC, UA 10 TO 20 0 - 5 /HPF    RBC, UA 0 TO 2 0 - 2 /HPF    Epithelial Cells UA 2 TO 5 0 - 5 /HPF    Bacteria, UA FEW (A) None   Lactate, Sepsis   Result Value Ref Range    Lactic Acid, Sepsis 1.4 0.5 - 1.9 mmol/L       +leukocytosis, UA with bacteria and WBC, elevated BUN    EMERGENCY DEPARTMENT COURSE:   Vitals:    Vitals:    05/20/22 1020 05/20/22 1307   BP: (!) 147/108 138/85   Pulse: 113 108   Resp: 16 16   Temp: 97.9 °F (36.6 °C)    TempSrc: Tympanic    SpO2: 94% 93%   Weight: 59.4 kg (131 lb)    Height: 5' 2\" (1.575 m)      -------------------------  BP: 138/85, Temp: 97.9 °F (36.6 °C), Pulse: 108, Resp: 16      RE-EVALUATION:  12:56 PM EDT  I updated the patient and his parents on his test results and plan of care. Agreeable to admission. CONSULTS:  Internal Medicine   I discussed the patient with Dr Bernardo Wyatt. He agrees to admit the patient. PROCEDURES:  None    FINAL IMPRESSION      1. Ileus ([address]    2. Dehydration          DISPOSITION/PLAN   DISPOSITION        CONDITION ON DISPOSITION:   Stable     PATIENT REFERRED TO:  No follow-up provider specified.     DISCHARGE MEDICATIONS:  New Prescriptions    No medications on file       (Please note that portions of this note were completed with a voicerecognition program.  Efforts were made to edit the dictations but occasionally words are mis-transcribed.)    Lowell Nolasco MD  Attending Emergency Medicine Physician        Lowell Nolasco MD  05/20/22 1299

## 2022-05-20 NOTE — CARE COORDINATION
Case Management Initial Discharge Plan  Erin Champion,         Met with:patient and parents to discuss discharge plans. Information verified: address, contacts, phone number, , insurance Yes  Insurance Provider: [facility]    Emergency Contact/Next of Kin name & number: parents Nadiya Hankins [phone]  Who are involved in patient's support system? parents    PCP: Delores Carranza DO  Date of last visit: past year      Discharge Planning    Living Arrangements:  Parent     Home has 2 stories  Ramp and lift to get into front door, n/a stairs to climb to reach second floor  Location of bedroom/bathroom in home 1    Patient able to perform ADL's:Dependent    Current Services (outpatient & in home) none  DME equipment: power chair - parents transfer patient to chair, patient can control chair  DME provider:     Is patient receiving oral anticoagulation therapy? No    Potential Assistance Needed:  Home Care    Patient agreeable to home care: No  [place] of choice provided:  no    Prior SNF/Rehab Placement and Facility: no  Agreeable to SNF/Rehab: No  [place] of choice provided: no     Evaluation: no    Expected Discharge date:       Patient expects to be discharged to: If home: is the family and/or caregiver wiling & able to provide support at home? yes  Who will be providing this support? parents    Follow Up Appointment: Best Day/ Time: Monday AM    Transportation provider: parents  Transportation arrangements needed for discharge: No    Readmission Risk              Risk of Unplanned Readmission:  14             Does patient have a readmission risk score greater than 14?: No  If yes, follow-up appointment must be made within 7 days of discharge. Goals of Care: infection management      Educated patient on transitional options, provided freedom of choice and are agreeable with plan      Discharge Plan: home with family - has DME - power wheelchair.  Family transports and provides care at home       Electronically signed by Macario Richard RN on 5/20/22 at 6:17 PM EDT

## 2022-05-20 NOTE — PROGRESS NOTES
[address] contacted regarding status of Artane that was to be brought over by 6 pm. Medication was brought to [facility] main pharmacy which is now closed. RN had Deyvi Guillory bring more over. 2145 - Medication arrived to floor. Patients legal guardian stated she rather wait till morning to give.

## 2022-05-21 ENCOUNTER — APPOINTMENT (OUTPATIENT)
Dept: GENERAL RADIOLOGY | Age: 22
DRG: 466 | End: 2022-05-21
Payer: MEDICAID

## 2022-05-21 PROBLEM — N31.9 NEUROGENIC BLADDER: Status: ACTIVE | Noted: 2022-05-21

## 2022-05-21 LAB
-: ABNORMAL
ABSOLUTE EOS #: 0.2 K/UL (ref 0–0.4)
ABSOLUTE LYMPH #: 2.4 K/UL (ref 1.2–5.2)
ABSOLUTE MONO #: 1 K/UL (ref 0.1–1.4)
ANION GAP SERPL CALCULATED.3IONS-SCNC: 7 MMOL/L (ref 9–17)
BACTERIA: ABNORMAL
BASOPHILS # BLD: 1 % (ref 0–2)
BASOPHILS ABSOLUTE: 0.1 K/UL (ref 0–0.2)
BILIRUBIN URINE: NEGATIVE
BUN BLDV-MCNC: 14 MG/DL (ref 6–20)
CALCIUM SERPL-MCNC: 8.8 MG/DL (ref 8.6–10.4)
CASTS UA: ABNORMAL /LPF
CHLORIDE BLD-SCNC: 105 MMOL/L (ref 98–107)
CO2: 27 MMOL/L (ref 20–31)
COLOR: YELLOW
CREAT SERPL-MCNC: 0.44 MG/DL (ref 0.7–1.2)
EOSINOPHILS RELATIVE PERCENT: 3 % (ref 1–4)
EPITHELIAL CELLS UA: ABNORMAL /HPF (ref 0–5)
GFR NON-AFRICAN AMERICAN: ABNORMAL ML/MIN
GFR SERPL CREATININE-BSD FRML MDRD: ABNORMAL ML/MIN/{1.73_M2}
GLUCOSE BLD-MCNC: 87 MG/DL (ref 70–99)
GLUCOSE URINE: NEGATIVE
HCT VFR BLD CALC: 39.9 % (ref 41–53)
HEMOGLOBIN: 13.4 G/DL (ref 13.5–17.5)
INR BLD: 1.2
KETONES, URINE: ABNORMAL
LEUKOCYTE ESTERASE, URINE: ABNORMAL
LYMPHOCYTES # BLD: 27 % (ref 25–45)
MCH RBC QN AUTO: 29.1 PG (ref 26–34)
MCHC RBC AUTO-ENTMCNC: 33.7 G/DL (ref 31–37)
MCV RBC AUTO: 86.5 FL (ref 80–100)
MONOCYTES # BLD: 11 % (ref 2–8)
NITRITE, URINE: NEGATIVE
PDW BLD-RTO: 13.8 % (ref 12.5–15.4)
PH UA: 6 (ref 5–8)
PLATELET # BLD: 246 K/UL (ref 140–450)
PMV BLD AUTO: 8.8 FL (ref 6–12)
POTASSIUM SERPL-SCNC: 4 MMOL/L (ref 3.7–5.3)
PROTEIN UA: ABNORMAL
PROTHROMBIN TIME: 11.9 SEC (ref 9.4–12.6)
RBC # BLD: 4.61 M/UL (ref 4.5–5.9)
RBC UA: ABNORMAL /HPF (ref 0–2)
SEG NEUTROPHILS: 58 % (ref 34–64)
SEGMENTED NEUTROPHILS ABSOLUTE COUNT: 5.1 K/UL (ref 1.8–8)
SODIUM BLD-SCNC: 139 MMOL/L (ref 135–144)
SPECIFIC GRAVITY UA: 1.02 (ref 1–1.03)
TURBIDITY: ABNORMAL
URINE HGB: NEGATIVE
UROBILINOGEN, URINE: NORMAL
WBC # BLD: 8.7 K/UL (ref 4.5–13.5)
WBC UA: ABNORMAL /HPF (ref 0–5)

## 2022-05-21 PROCEDURE — 71045 X-RAY EXAM CHEST 1 VIEW: CPT

## 2022-05-21 PROCEDURE — 1210000000 HC MED SURG R&B

## 2022-05-21 PROCEDURE — 2580000003 HC RX 258: Performed by: HOSPITALIST

## 2022-05-21 PROCEDURE — 85025 COMPLETE CBC W/AUTO DIFF WBC: CPT

## 2022-05-21 PROCEDURE — 80048 BASIC METABOLIC PNL TOTAL CA: CPT

## 2022-05-21 PROCEDURE — 6360000002 HC RX W HCPCS: Performed by: HOSPITALIST

## 2022-05-21 PROCEDURE — 36415 COLL VENOUS BLD VENIPUNCTURE: CPT

## 2022-05-21 PROCEDURE — 6370000000 HC RX 637 (ALT 250 FOR IP): Performed by: HOSPITALIST

## 2022-05-21 PROCEDURE — 85610 PROTHROMBIN TIME: CPT

## 2022-05-21 PROCEDURE — 99232 SBSQ HOSP IP/OBS MODERATE 35: CPT | Performed by: STUDENT IN AN ORGANIZED HEALTH CARE EDUCATION/TRAINING PROGRAM

## 2022-05-21 PROCEDURE — 2700000000 HC OXYGEN THERAPY PER DAY

## 2022-05-21 PROCEDURE — 74018 RADEX ABDOMEN 1 VIEW: CPT

## 2022-05-21 RX ADMIN — Medication 10 MG: at 20:31

## 2022-05-21 RX ADMIN — TRIHEXYPHENIDYL HYDROCHLORIDE 4 MG: 2 TABLET ORAL at 08:44

## 2022-05-21 RX ADMIN — DOCUSATE SODIUM 100 MG: 100 CAPSULE, LIQUID FILLED ORAL at 20:31

## 2022-05-21 RX ADMIN — CEFTRIAXONE SODIUM 1000 MG: 1 INJECTION, POWDER, FOR SOLUTION INTRAMUSCULAR; INTRAVENOUS at 12:01

## 2022-05-21 RX ADMIN — GABAPENTIN 200 MG: 100 CAPSULE ORAL at 08:06

## 2022-05-21 RX ADMIN — CLONAZEPAM 1 MG: 0.5 TABLET ORAL at 08:06

## 2022-05-21 RX ADMIN — TRIHEXYPHENIDYL HYDROCHLORIDE 4 MG: 2 TABLET ORAL at 20:31

## 2022-05-21 RX ADMIN — GABAPENTIN 200 MG: 100 CAPSULE ORAL at 13:49

## 2022-05-21 RX ADMIN — GABAPENTIN 200 MG: 100 CAPSULE ORAL at 20:31

## 2022-05-21 RX ADMIN — LEVETIRACETAM 750 MG: 500 SOLUTION ORAL at 20:31

## 2022-05-21 RX ADMIN — MONTELUKAST 10 MG: 10 TABLET, FILM COATED ORAL at 20:31

## 2022-05-21 RX ADMIN — CLONAZEPAM 1 MG: 0.5 TABLET ORAL at 13:49

## 2022-05-21 RX ADMIN — BISACODYL 10 MG: 10 SUPPOSITORY RECTAL at 08:06

## 2022-05-21 RX ADMIN — SODIUM CHLORIDE, PRESERVATIVE FREE 10 ML: 5 INJECTION INTRAVENOUS at 20:31

## 2022-05-21 RX ADMIN — TRIHEXYPHENIDYL HYDROCHLORIDE 4 MG: 2 TABLET ORAL at 13:49

## 2022-05-21 RX ADMIN — CETIRIZINE HYDROCHLORIDE 10 MG: 10 TABLET, FILM COATED ORAL at 08:06

## 2022-05-21 RX ADMIN — LEVETIRACETAM 750 MG: 500 SOLUTION ORAL at 08:07

## 2022-05-21 RX ADMIN — DIVALPROEX SODIUM 125 MG: 125 TABLET, DELAYED RELEASE ORAL at 20:31

## 2022-05-21 RX ADMIN — CLONAZEPAM 1 MG: 0.5 TABLET ORAL at 20:31

## 2022-05-21 RX ADMIN — DOCUSATE SODIUM 100 MG: 100 CAPSULE, LIQUID FILLED ORAL at 08:06

## 2022-05-21 ASSESSMENT — PAIN SCALES - GENERAL: PAINLEVEL_OUTOF10: 0

## 2022-05-21 NOTE — PROGRESS NOTES
[facility]  Office: [address], DO, Julissa Schulte, DO, Timothy Mao, DO, Lennox Mars Blood, DO, Lillian Winters MD, Nick Vazquez MD, Pranav Keyes MD, Jose Asif MD, Ileana Morrison MD, Parisa Larry MD, Trip Martinez MD, Yani Richards, DO, Naman Walters, DO, Nahed Doll MD,  Sol Kerr, DO, Burna Cockayne, MD, Altagracia Nash MD, Cheyenne Santoyo MD, Kim Shine, DO, Shawna Crigler, MD, Nasim Christie MD, Luz Elena Hemphill MD, Ananda Macias CNP, [facility], CNP, Yumi Rios CNP, Blanca Topete, CNP, Viola Courtney, CNP, Doretha Valverde, CNP, Kwame Shen PAKellyC, Daisey Meigs, [facility], Nae Em, CNP, Berna Galicia, CNP, Guanaco Silveira, CNP, Paddy Duane, CNS, Lakeisha Lemus, [facility], Rosalinda Gonzalez, CNP, Elma Arcos, CNP, Kathleen Fay, CNP         [facility]    Progress Note    5/21/2022    3:28 PM    Name:   Nay Mendez  MRN:     [id number]     Acct:      [de-identified]   Room:   [address] [place] [address] Day:  1  Admit Date:  5/20/2022 10:14 AM    PCP:   Skylar Otto DO  Code Status:  Full Code    Subjective:     C/C:   Chief Complaint   Patient presents with    Abdominal Pain     Interval History Status: not changed. Has improvement in abdominal distention and pain  Continues to feel weak  Per mother abdominal distention has significantly improved  Not passing gas or having any bowel movement  Tolerated clear liquids     Brief History:   Per my partner-  This very pleasant 72-year-old male was brought to the hospital by his parents due to abdominal pain, nausea, vomiting. Imaging studies have shown a ileus. Clinically it appears that he has a acute cystitis without hematuria which is the most likely etiology behind his ileus. The patient has cerebral palsy and spastic paraplegia. He does require self-catheterization and appears to have a urinary tract infection secondary to self-catheterization.   Ileus is likely secondary to self-catheterization. Patient continues on  Rocephin along with IV fluids. Antiemetics have helped with his nausea. He still has some mild abdominal pain. He is relatively nonverbal but does smile and answer very simple questions. Family at the bedside provide majority of his history    Review of Systems:     Constitutional:  negative for chills, fevers, sweats  Respiratory:  negative for cough, dyspnea on exertion, shortness of breath, wheezing  Cardiovascular:  negative for chest pain, chest pressure/discomfort, lower extremity edema, palpitations  Gastrointestinal:  + for abdominal pain, +constipation,no diarrhea, nausea, vomiting  Neurological:  negative for dizziness, headache    Medications: Allergies: Allergies   Allergen Reactions    Sulfa Antibiotics Other (See Comments)     Fever    Vancomycin Rash     Red Man       Current Meds:   Scheduled Meds:    sodium chloride flush  5-40 mL IntraVENous 2 times per day    enoxaparin  40 mg SubCUTAneous Daily    cefTRIAXone (ROCEPHIN) IV  1,000 mg IntraVENous Q24H    bisacodyl  10 mg Rectal Daily    divalproex  125 mg Oral Nightly    docusate sodium  100 mg Oral BID    gabapentin  200 mg Oral TID    levETIRAcetam  750 mg Oral BID    cetirizine  10 mg Oral Daily    melatonin  10 mg Oral Nightly    montelukast  10 mg Oral Nightly    pantoprazole  40 mg Oral QAM AC    trihexyphenidyl  4 mg Oral TID    clonazePAM  1 mg Oral TID     Continuous Infusions:    sodium chloride       PRN Meds: sodium chloride flush, sodium chloride flush, sodium chloride, potassium chloride **OR** potassium alternative oral replacement **OR** potassium chloride, magnesium sulfate, ondansetron **OR** ondansetron, polyethylene glycol, acetaminophen **OR** acetaminophen, albuterol sulfate HFA, diazePAM    Data:     Past Medical History:   has a past medical history of Cerebral palsy ([facility]) and Seizures ([facility] [address]     Social History:   reports that he has never smoked. He has never used smokeless tobacco. He reports that he does not drink alcohol and does not use drugs. Family History: History reviewed. No pertinent family history. Vitals:  /74   Pulse 95   Temp 98.6 °F (37 °C) (Axillary)   Resp 14   Ht 5' 2\" (1.575 m)   Wt 131 lb (59.4 kg)   SpO2 96%   BMI 23.96 kg/m²   Temp (24hrs), Av.5 °F (36.9 °C), Min:97.7 °F (36.5 °C), Max:99.1 °F (37.3 °C)    No results for input(s): POCGLU in the last 72 hours. I/O (24Hr): Intake/Output Summary (Last 24 hours) at 2022 152  Last data filed at 2022  Gross per 24 hour   Intake --   Output 650 ml   Net -650 ml       Labs:  Hematology:  Recent Labs     22  1045 22  0517   WBC 14.8* 8.7   RBC 6.07* 4.61   HGB 17.7* 13.4*   HCT 51.7 39.9*   MCV 85.1 86.5   MCH 29.1 29.1   MCHC 34.2 33.7   RDW 13.4 13.8    246   MPV 8.7 8.8   INR  --  1.2     Chemistry:  Recent Labs     22  1045 22  0517   * 139   K 4.6 4.0   CL 93* 105   CO2 25 27   GLUCOSE 110* 87   BUN 35* 14   CREATININE 0.60* 0.44*   ANIONGAP 16 7*   LABGLOM Pediatric GFR requires additional information. Refer to [facility] website for calculator. Pediatric GFR requires additional information. Refer to [facility] website for calculator.    CALCIUM 10.3 8.8     Recent Labs     22  1045   PROT 8.1   LABALBU 4.6   AST 22   ALT 25   ALKPHOS 93   BILITOT 0.59   BILIDIR 0.12   LIPASE 9*     ABG:No results found for: POCPH, PHART, PH, POCPCO2, NKM7GCY, PCO2, POCPO2, PO2ART, PO2, POCHCO3, NMB6SUM, HCO3, NBEA, PBEA, BEART, BE, THGBART, THB, HBR5ZVO, MHBM6CNF, F6VGFCCC, O2SAT, FIO2  Lab Results   Component Value Date/Time    SPECIAL NOT REPORTED 2022 07:30 AM     Lab Results   Component Value Date/Time    CULTURE ESCHERICHIA COLI 10 to 50,000 CFU/ML (A) 2022 11:46 AM       Radiology:  CT ABDOMEN PELVIS W IV CONTRAST Additional Contrast? None    Result Date: 2022  Multiple loops of air and fluid-filled large and small bowel with air in the rectum. This appears compatible with a generalized ileus. Physical Examination:       General appearance:  alert, cooperative and no distress  Mental Status:  Not able to assess menattion  Lungs:  decr breath sounds bilaterally, normal effort  Heart:  regular rate and rhythm, no murmur  Abdomen:  soft, nontender, nondistended, normal bowel sounds, no masses, hepatomegaly, splenomegaly, g tube in place  Extremities:  no edema, redness, tenderness in the calves  Skin:  no gross lesions, rashes, induration  quadriplegic  Assessment:     Hospital Problems           Last Modified POA    * (Principal) Ileus ([address] 5/20/2022 Yes    Acute cystitis without hematuria 5/20/2022 Yes    Neurogenic bladder 5/21/2022 Yes    Spastic quadriplegic cerebral palsy ([address] 5/20/2022 Yes    Other constipation 5/21/2022 Yes    History of recurrent UTIs 5/21/2022 Yes    Seizure disorder ([address] 5/20/2022 Yes    GERD (gastroesophageal reflux disease) 5/21/2022 Yes    History of spinal fusion for scoliosis 5/21/2022 Yes    Scoliosis 5/20/2022 Yes          Plan:     Ileus-xray shows no improvement in ileus, on full liquid diet, distension clinically improving, will get general surgery input.  Continue dulcolax suppository    UTI- continue rocephin, urine culture showing E COLI    Acute respiratory failure with new oxygen needs- stop iv fluids, cxr clear,  encouarge incentive spirometry    Spastic quadriplegia with seizure history- continue home meds    Urinary retention- staright cath as needed    Pt/ot    Janette Hernandes MD  5/21/2022  3:28 PM

## 2022-05-21 NOTE — CONSULTS
GENERAL SURGERY CONSULTATION- Inpatient      Patient's Name/ Date of Birth/ Gender: Mookie Ward / 2000 (25 y.o.) / male     PCP: Salome Wills DO  Referring: Hospitalist team    History of present Illness:  Patient is a pleasant 25 y.o. male  kindly referred for evaluation of abdominal distention. 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 He is well taken care of at home with a devoted family. He had Nissen fundoplication at 3days old. He developed severe abdominal distention, family placed PEG tube to gravity with relief. Brought to ER, CT and xrays reviewed. He has a UTI, being treated for it, takes laxatives regularly, routine regimen has been in place for bowels for a long time but this ileus/distention was new. Had bowel movements overnight. Doing much better. Past Medical History:  has a past medical history of Cerebral palsy ([address] and Seizures ([address] Past Surgical History:   Past Surgical History:   Procedure Laterality Date    BACK SURGERY      BACLOFEN PUMP IMPLANTATION      GASTRIC FUNDOPLICATION      HIP SURGERY         Social History:  reports that he has never smoked. He has never used smokeless tobacco. He reports that he does not drink alcohol and does not use drugs. Family History: family history is not on file.     Review of Systems:   General: Completed and, except as mentioned above, was negative or noncontributory  Psychological:  Completed and, except as mentioned above, was negative or noncontributory  Ophthalmic:  Completed and, except as mentioned above, was negative or noncontributory  ENT:  Completed and, except as mentioned above, was negative or noncontributory  Allergy and Immunology:  Completed and, except as mentioned above, was negative or noncontributory  Hematological and Lymphatic:  Completed and, except as mentioned above, was negative or noncontributory  Endocrine: Completed and, except as mentioned above, was negative or noncontributory  Breast:  Completed and, except as mentioned above, was negative or noncontributory  Respiratory:  Completed and, except as mentioned above, was negative or noncontributory  Cardiovascular:  Completed and, except as mentioned above, was negative or noncontributory  Gastrointestinal: Completed and, except as mentioned above, was negative or noncontributory  Genito-Urinary:  Completed and, except as mentioned above, was negative or noncontributory  Musculoskeletal:  Completed and, except as mentioned above, was negative or noncontributory  Neurological:  Completed and, except as mentioned above, was negative or noncontributory  Dermatological:  Completed and, except as mentioned above, was negative or noncontributory    Allergies: Sulfa antibiotics and Vancomycin    Current Meds:  Current Facility-Administered Medications:     cephALEXin (KEFLEX) capsule 500 mg, 500 mg, Oral, 2 times per day, Amadou Lobo MD, 500 mg at 05/22/22 1018    sodium chloride flush 0.9 % injection 10 mL, 10 mL, IntraVENous, PRN, Edith Jensen MD, 10 mL at 05/20/22 1156    sodium chloride flush 0.9 % injection 5-40 mL, 5-40 mL, IntraVENous, 2 times per day, Mahogany Labor, DO, 10 mL at 05/22/22 0745    sodium chloride flush 0.9 % injection 10 mL, 10 mL, IntraVENous, PRN, Mahogany Labor, DO    0.9 % sodium chloride infusion, , IntraVENous, PRN, Mahogany Labor, DO    potassium chloride (KLOR-CON M) extended release tablet 40 mEq, 40 mEq, Oral, PRN **OR** potassium bicarb-citric acid (EFFER-K) effervescent tablet 40 mEq, 40 mEq, Oral, PRN **OR** potassium chloride 10 mEq/100 mL IVPB (Peripheral Line), 10 mEq, IntraVENous, PRN, Mahogany Labor, DO    magnesium sulfate 1000 mg in dextrose 5% 100 mL IVPB, 1,000 mg, IntraVENous, PRN, Mahogany Labor, DO    enoxaparin (LOVENOX) injection 40 mg, 40 mg, SubCUTAneous, Daily, Mahogany Labor, DO    ondansetron (ZOFRAN-ODT) disintegrating tablet 4 mg, 4 mg, Oral, Q8H PRN **OR** ondansetron (ZOFRAN) injection 4 mg, 4 mg, IntraVENous, Q6H PRN, Ruben Wood, DO    polyethylene glycol (GLYCOLAX) packet 17 g, 17 g, Oral, Daily PRN, Ruben Quinonesy, DO    acetaminophen (TYLENOL) tablet 650 mg, 650 mg, Oral, Q6H PRN **OR** acetaminophen (TYLENOL) suppository 650 mg, 650 mg, Rectal, Q6H PRN, Ruben Wood, DO    albuterol sulfate  (90 Base) MCG/ACT inhaler 2 puff, 2 puff, Inhalation, Q4H PRN, Ruben Wood, DO    bisacodyl (DULCOLAX) suppository 10 mg, 10 mg, Rectal, Daily, Ruben Wood, DO, 10 mg at 05/22/22 0758    diazePAM (VALIUM) tablet 5 mg, 5 mg, Oral, Q6H PRN, Ruben Wood, DO    divalproex (DEPAKOTE) DR tablet 125 mg, 125 mg, Oral, Nightly, Ruben Quinonesy, DO, 125 mg at 05/21/22 2031    docusate sodium (COLACE) capsule 100 mg, 100 mg, Oral, BID, Ruben Quinonesy, DO, 100 mg at 05/22/22 3114    gabapentin (NEURONTIN) capsule 200 mg, 200 mg, Oral, TID, Ruben Quinonesy, DO, 200 mg at 05/22/22 1304    levETIRAcetam (KEPPRA) 100 MG/ML solution 750 mg, 750 mg, Oral, BID, Ruben Quinonesy, DO, 750 mg at 05/22/22 7492    cetirizine (ZYRTEC) tablet 10 mg, 10 mg, Oral, Daily, Ruben Quinonesy, DO, 10 mg at 05/22/22 1791    melatonin tablet 10 mg, 10 mg, Oral, Nightly, Ruben Quinonesy, DO, 10 mg at 05/21/22 2031    montelukast (SINGULAIR) tablet 10 mg, 10 mg, Oral, Nightly, Ruben Staciey, DO, 10 mg at 05/21/22 2031    pantoprazole (PROTONIX) tablet 40 mg, 40 mg, Oral, QAM AC, [facility], 40 mg at 05/22/22 0055    trihexyphenidyl (ARTANE) tablet 4 mg, 4 mg, Oral, TID, [facility], [facility], 4 mg at 05/22/22 1304    clonazePAM (KLONOPIN) tablet 1 mg, 1 mg, Oral, TID, [facility], DO, 1 mg at 05/22/22 1304    Current Outpatient Medications:     cephALEXin (KEFLEX) 500 MG capsule, Take 1 capsule by mouth every 12 hours for 9 doses, Disp: 9 capsule, Rfl: 0    bisacodyl (DULCOLAX) 10 MG suppository, Place 10 mg rectally, Disp: , Rfl:     docusate (COLACE, DULCOLAX) 100 MG CAPS, Take 100 mg by mouth 3 times daily as needed, Disp: , Rfl:     Sennosides 15 MG TABS, Take 1 tablet by mouth daily, Disp: , Rfl:     Fluticasone Propionate (FLONASE NA), by Nasal route, Disp: , Rfl:     Fexofenadine HCl (MUCINEX ALLERGY PO), Take by mouth, Disp: , Rfl:     levETIRAcetam (KEPPRA) 100 MG/ML solution, Take 7.5 ml twice daily, Disp: 450 mL, Rfl: 3    divalproex (DEPAKOTE) 125 MG DR tablet, Take one tab at bedtime (on weaning down trial), Disp: 30 tablet, Rfl: 2    diazePAM (VALIUM) 10 MG tablet, Take 5 mg by mouth as needed. , Disp: , Rfl:     clonazePAM (KLONOPIN) 0.5 MG tablet, 1 mg 3 times daily. 0.5 am and 0.5noon  (2 )0.5 mg at night, Disp: , Rfl:     glycopyrrolate (CUVPOSA) 1 MG/5ML SOLN solution, Take 900 mcg by mouth 3 times daily (Patient not taking: Reported on 11/3/2021), Disp: , Rfl:     gabapentin (NEURONTIN) 100 MG capsule, Take 100 mg by mouth 3 times daily. 200mg Morning and evening 100mg afternoon, Disp: , Rfl:     albuterol sulfate  (90 Base) MCG/ACT inhaler, Inhale 2 puffs into the lungs every 4 hours as needed, Disp: , Rfl:     polyethylene glycol (GLYCOLAX) 17 GM/SCOOP powder, Take 17 g by mouth daily (Patient not taking: Reported on 5/20/2022), Disp: , Rfl:     Omeprazole (PRILOSEC PO), Take by mouth, Disp: , Rfl:     Catheters MISC, 16 Fr. Latex free, coude tip intermittent urinary catheters. Use as directed 4 times daily. , Disp: 120 each, Rfl: 11    trihexyphenidyl (ARTANE) 2 MG tablet, Take 4 mg by mouth 3 times daily, Disp: , Rfl:     loratadine (CLARITIN) 10 MG tablet, Take 10 mg by mouth daily, Disp: , Rfl:     montelukast (SINGULAIR) 10 MG tablet, Take 10 mg by mouth nightly, Disp: , Rfl:     Melatonin 10 MG TABS, Take 10 mg by mouth nightly, Disp: , Rfl:     BACLOFEN, PAIN PUMP REFILL CHARGE,, , Disp: , Rfl:     Physical Exam:  Vital signs and Nurse's note reviewed. /84   Pulse 73   Temp 98.4 °F (36.9 °C) (Oral)   Resp 16   Ht 5' 2\" (1.575 m)   Wt 131 lb (59.4 kg)   SpO2 94%   BMI 23.96 kg/m²    height is 5' 2\" (1.575 m) and weight is 131 lb (59.4 kg). His oral temperature is 98.4 °F (36.9 °C). His blood pressure is 134/84 and his pulse is 73. His respiration is 16 and oxygen saturation is 94%. Gen:  Alert, smiling, talking to parents. Pleasant and cooperative.   HEENT: PERRLA, EOMI, no scleral icterus  Neck:  no goiter  CVS: Regular rate and rhythm  Resp: Good bilateral air entry, no active wheezing, no labored breathing  Abd: softly distended, button peg tube site clean dry intact, RLQ subcutaneous baclofen pump noted  Ext: Moves all extremities, no gross focal motor deficits  Skin: No erythema or ulcerations     Labs:   Lab Results   Component Value Date    WBC 5.7 05/22/2022    HGB 14.2 05/22/2022    HCT 42.0 05/22/2022    MCV 86.7 05/22/2022     05/22/2022     Lab Results   Component Value Date     05/22/2022    K 4.1 05/22/2022     05/22/2022    CO2 27 05/22/2022    BUN 6 05/22/2022    CREATININE <0.40 05/22/2022    GLUCOSE 89 05/22/2022    CALCIUM 9.3 05/22/2022     Lab Results   Component Value Date    ALKPHOS 93 05/20/2022    ALT 25 05/20/2022    AST 22 05/20/2022    PROT 8.1 05/20/2022    BILITOT 0.59 05/20/2022    BILIDIR 0.12 05/20/2022    LABALBU 4.6 05/20/2022     No results found for: AMYLASE  Lab Results   Component Value Date    LIPASE 9 05/20/2022     Lab Results   Component Value Date    INR 1.2 05/21/2022       Radiologic Studies:    EXAMINATION:   ONE SUPINE XRAY VIEW(S) OF THE ABDOMEN       5/21/2022 1:33 pm       COMPARISON:   05/20/2022       HISTORY:   ORDERING SYSTEM PROVIDED HISTORY: follow up on ileus   TECHNOLOGIST PROVIDED HISTORY:   follow up on ileus   Reason for Exam: abdominal pain, hypoxia       FINDINGS:   Moderate gaseous distention of both large and small bowel persists throughout   the abdomen and pelvis, minimally improved from prior. Serene Scrivener is no   organomegaly, free air or suspicious calcification.           Impression   Minimal improvement. EXAMINATION:   CT OF THE ABDOMEN AND PELVIS WITH CONTRAST 5/20/2022 11:11 am       TECHNIQUE:   CT of the abdomen and pelvis was performed with the administration of   intravenous contrast. Multiplanar reformatted images are provided for review. Automated exposure control, iterative reconstruction, and/or weight based   adjustment of the mA/kV was utilized to reduce the radiation dose to as low   as reasonably achievable.       COMPARISON:   None.       HISTORY:   ORDERING SYSTEM PROVIDED HISTORY: abdominal pain and distention   TECHNOLOGIST PROVIDED HISTORY:       abdominal pain and distention   Decision Support Exception - unselect if not a suspected or confirmed   emergency medical condition->Emergency Medical Condition (MA)   Reason for Exam: Abdominal pain       FINDINGS:   Lower Chest: Lung bases are clear.       Organs: The liver is normal.  Gallbladder is normal.  Spleen and pancreas are   normal.  Adrenal glands are normal.  Kidneys are normal.       GI/Bowel: There is a PEG tube with the tip in the stomach.  There are   distended air and fluid-filled loops of colon in a nonobstructive pattern. Some of the dilated loops of bowel appear to represent small bowel.  The   duodenum is not dilated.  No clear transition point is seen.       Pelvis: Urinary bladder is normal.  Prostate is normal.  No free fluid.       Peritoneum/Retroperitoneum: The aorta is normal caliber.  Numerous prominent   mesenteric lymph nodes       Bones/Soft Tissues:  There is fusion hardware throughout the thoracic and   lumbar spine and into the pelvis.  No acute complication.           Impression   Multiple loops of air and fluid-filled large and small bowel with air in the   rectum.  This appears compatible with a generalized ileus.           Impressions/Recommendations: CP/quadriplegia since birth. Acute abdominal distention improving- ileus due to likely acute UTI. On antibiotics, continuing bowel regimen. Parents are highly involved in care. Patient is smiling and saying he wants to go home. He is being discharged later today. No surgical issues.      Adilene Denny DO, MPH, [address] [address] Surgery, [address] [place] [address] office [phone]  [place] office [phone]

## 2022-05-21 NOTE — PROGRESS NOTES
Occupational [address]  Occupational Therapy Not Seen Note    DATE: 2022    NAME: Tomeka Haro  MRN: [id number]   : 2000      Patient not seen this date for Occupational Therapy due to:    Per discussion with Pt and Caregivers (parents), Pt is currently functioning at his baseline level. He is wheelchair and bed-bound, transfers with beverly at home. Pt has no acute OT needs at this time. OT will be deferred. Please reorder OT if future needs arise.          Electronically signed by HIMA Goldberg OTR/L on 2022 at 10:39 AM

## 2022-05-21 NOTE — PROGRESS NOTES
Physical Therapy         Physical Therapy Cancel Note      DATE: 2022    NAME: Armando Duaret  MRN: [id number]   : 2000      Patient not seen this date for Physical Therapy due to:    Patient at baseline functional level with no acute PT needs. Will defer PT evaluation at this time. Please reorder PT if future needs arise. Pt is at baseline per patient's parents. Advised to notify nursing of any therapy needs that arise while admitted.       Electronically signed by Virgilio Zavala PT on 2022 at 10:35 AM

## 2022-05-21 NOTE — PLAN OF CARE
Problem: Discharge Planning  Goal: Discharge to home or other facility with appropriate resources  Outcome: Progressing  Flowsheets  Taken 5/20/2022 2000 by Natalie Hatch RN  Discharge to home or other facility with appropriate resources: Identify barriers to discharge with patient and caregiver  Taken 5/20/2022 1439 by Erika Reed RN  Discharge to home or other facility with appropriate resources:   Identify barriers to discharge with patient and caregiver   Arrange for needed discharge resources and transportation as appropriate   Identify discharge learning needs (meds, wound care, etc)     Problem: Pain  Goal: Verbalizes/displays adequate comfort level or baseline comfort level  Outcome: Progressing     Problem: Skin/Tissue Integrity  Goal: Absence of new skin breakdown  Description: 1. Monitor for areas of redness and/or skin breakdown  2. Assess vascular access sites hourly  3. Every 4-6 hours minimum:  Change oxygen saturation probe site  4. Every 4-6 hours:  If on nasal continuous positive airway pressure, respiratory therapy assess nares and determine need for appliance change or resting period.   Outcome: Progressing

## 2022-05-21 NOTE — PROGRESS NOTES
RT in to assess 02 sat . Rn had placed pt on 2lpm per cannula . Sat currently 93% on 2lpm . I did provide Incentive Spirometry , family would like pt to have lunch first then will work on medical device .  He has used this in the past .

## 2022-05-22 VITALS
OXYGEN SATURATION: 94 % | WEIGHT: 131 LBS | SYSTOLIC BLOOD PRESSURE: 134 MMHG | HEIGHT: 62 IN | BODY MASS INDEX: 24.11 KG/M2 | RESPIRATION RATE: 16 BRPM | DIASTOLIC BLOOD PRESSURE: 84 MMHG | TEMPERATURE: 98.4 F | HEART RATE: 73 BPM

## 2022-05-22 LAB
ABSOLUTE EOS #: 0.2 K/UL (ref 0–0.4)
ABSOLUTE LYMPH #: 1.8 K/UL (ref 1.2–5.2)
ABSOLUTE MONO #: 0.6 K/UL (ref 0.1–1.4)
ANION GAP SERPL CALCULATED.3IONS-SCNC: 9 MMOL/L (ref 9–17)
BASOPHILS # BLD: 0 % (ref 0–2)
BASOPHILS ABSOLUTE: 0 K/UL (ref 0–0.2)
BUN BLDV-MCNC: 6 MG/DL (ref 6–20)
CALCIUM SERPL-MCNC: 9.3 MG/DL (ref 8.6–10.4)
CHLORIDE BLD-SCNC: 107 MMOL/L (ref 98–107)
CO2: 27 MMOL/L (ref 20–31)
CREAT SERPL-MCNC: <0.4 MG/DL (ref 0.7–1.2)
CULTURE: ABNORMAL
EOSINOPHILS RELATIVE PERCENT: 3 % (ref 1–4)
GFR AFRICAN AMERICAN: ABNORMAL ML/MIN
GFR NON-AFRICAN AMERICAN: ABNORMAL ML/MIN
GFR SERPL CREATININE-BSD FRML MDRD: ABNORMAL ML/MIN/{1.73_M2}
GLUCOSE BLD-MCNC: 89 MG/DL (ref 70–99)
HCT VFR BLD CALC: 42 % (ref 41–53)
HEMOGLOBIN: 14.2 G/DL (ref 13.5–17.5)
LYMPHOCYTES # BLD: 31 % (ref 25–45)
MCH RBC QN AUTO: 29.3 PG (ref 26–34)
MCHC RBC AUTO-ENTMCNC: 33.8 G/DL (ref 31–37)
MCV RBC AUTO: 86.7 FL (ref 80–100)
MONOCYTES # BLD: 10 % (ref 2–8)
PDW BLD-RTO: 13.8 % (ref 12.5–15.4)
PLATELET # BLD: 226 K/UL (ref 140–450)
PMV BLD AUTO: 8 FL (ref 6–12)
POTASSIUM SERPL-SCNC: 4.1 MMOL/L (ref 3.7–5.3)
RBC # BLD: 4.85 M/UL (ref 4.5–5.9)
SEG NEUTROPHILS: 56 % (ref 34–64)
SEGMENTED NEUTROPHILS ABSOLUTE COUNT: 3.2 K/UL (ref 1.8–8)
SODIUM BLD-SCNC: 143 MMOL/L (ref 135–144)
SPECIMEN DESCRIPTION: ABNORMAL
WBC # BLD: 5.7 K/UL (ref 4.5–13.5)

## 2022-05-22 PROCEDURE — 85025 COMPLETE CBC W/AUTO DIFF WBC: CPT

## 2022-05-22 PROCEDURE — 36415 COLL VENOUS BLD VENIPUNCTURE: CPT

## 2022-05-22 PROCEDURE — 6370000000 HC RX 637 (ALT 250 FOR IP): Performed by: STUDENT IN AN ORGANIZED HEALTH CARE EDUCATION/TRAINING PROGRAM

## 2022-05-22 PROCEDURE — 80048 BASIC METABOLIC PNL TOTAL CA: CPT

## 2022-05-22 PROCEDURE — 2580000003 HC RX 258: Performed by: HOSPITALIST

## 2022-05-22 PROCEDURE — 6370000000 HC RX 637 (ALT 250 FOR IP): Performed by: HOSPITALIST

## 2022-05-22 PROCEDURE — 94760 N-INVAS EAR/PLS OXIMETRY 1: CPT

## 2022-05-22 PROCEDURE — 99239 HOSP IP/OBS DSCHRG MGMT >30: CPT | Performed by: STUDENT IN AN ORGANIZED HEALTH CARE EDUCATION/TRAINING PROGRAM

## 2022-05-22 RX ORDER — CEPHALEXIN 500 MG/1
500 CAPSULE ORAL EVERY 12 HOURS SCHEDULED
Qty: 9 CAPSULE | Refills: 0 | Status: SHIPPED | OUTPATIENT
Start: 2022-05-22 | End: 2022-05-27

## 2022-05-22 RX ORDER — CEPHALEXIN 250 MG/1
500 CAPSULE ORAL EVERY 12 HOURS SCHEDULED
Status: DISCONTINUED | OUTPATIENT
Start: 2022-05-22 | End: 2022-05-22 | Stop reason: HOSPADM

## 2022-05-22 RX ADMIN — GABAPENTIN 200 MG: 100 CAPSULE ORAL at 13:04

## 2022-05-22 RX ADMIN — PANTOPRAZOLE SODIUM 40 MG: 40 TABLET, DELAYED RELEASE ORAL at 07:58

## 2022-05-22 RX ADMIN — CLONAZEPAM 1 MG: 0.5 TABLET ORAL at 07:57

## 2022-05-22 RX ADMIN — CLONAZEPAM 1 MG: 0.5 TABLET ORAL at 13:04

## 2022-05-22 RX ADMIN — TRIHEXYPHENIDYL HYDROCHLORIDE 4 MG: 2 TABLET ORAL at 07:58

## 2022-05-22 RX ADMIN — DOCUSATE SODIUM 100 MG: 100 CAPSULE, LIQUID FILLED ORAL at 07:58

## 2022-05-22 RX ADMIN — TRIHEXYPHENIDYL HYDROCHLORIDE 4 MG: 2 TABLET ORAL at 13:04

## 2022-05-22 RX ADMIN — CEPHALEXIN 500 MG: 250 CAPSULE ORAL at 10:18

## 2022-05-22 RX ADMIN — BISACODYL 10 MG: 10 SUPPOSITORY RECTAL at 07:58

## 2022-05-22 RX ADMIN — SODIUM CHLORIDE, PRESERVATIVE FREE 10 ML: 5 INJECTION INTRAVENOUS at 07:45

## 2022-05-22 RX ADMIN — CETIRIZINE HYDROCHLORIDE 10 MG: 10 TABLET, FILM COATED ORAL at 07:58

## 2022-05-22 RX ADMIN — GABAPENTIN 200 MG: 100 CAPSULE ORAL at 07:57

## 2022-05-22 RX ADMIN — LEVETIRACETAM 750 MG: 500 SOLUTION ORAL at 07:58

## 2022-05-22 NOTE — FLOWSHEET NOTE
Pt mother is straight cathing patient per request, no issues noted.  Has straight cathed patient once this morning
Surgery at bedside.
Chaplain Kyara, on 5/20/2022 at 12:31 PM.  [facility]  [phone]

## 2022-05-22 NOTE — PLAN OF CARE
Problem: Discharge Planning  Goal: Discharge to home or other facility with appropriate resources  Outcome: Progressing  Flowsheets (Taken 5/21/2022 2000)  Discharge to home or other facility with appropriate resources: Identify barriers to discharge with patient and caregiver     Problem: Pain  Goal: Verbalizes/displays adequate comfort level or baseline comfort level  Outcome: Progressing     Problem: Skin/Tissue Integrity  Goal: Absence of new skin breakdown  Description: 1. Monitor for areas of redness and/or skin breakdown  2. Assess vascular access sites hourly  3. Every 4-6 hours minimum:  Change oxygen saturation probe site  4. Every 4-6 hours:  If on nasal continuous positive airway pressure, respiratory therapy assess nares and determine need for appliance change or resting period.   Outcome: Progressing     Problem: Safety - Adult  Goal: Free from fall injury  Outcome: Progressing  Flowsheets (Taken 5/22/2022 0143)  Free From Fall Injury: Instruct family/caregiver on patient safety

## 2022-05-22 NOTE — PROGRESS NOTES
[facility]  Office: [address], DO, Catalino Myles, DO, Mindi Colleen, DO, Judi Mejia, DO, Florence Carroll MD, Gisselle Rios MD, Shira Mckeon MD, Brett Mclaughlin MD, Yaa Braden MD, Chelsi Antunez MD, Sandy Sparrow MD, Tasha Kay, DO, Mundo Messer, DO, Amalia Hayden MD,  Jessy Whittington, DO, Jordan Souza MD, Varun Lorenz MD, Carrie Benjamin MD, Tristan Jarquin DO, Simeon Head MD, Ronny Cisneros MD, Yamila Morris MD, Сергей Katz [facility], [facility], CNP, Sebas Turner, CNP, Sherley Guerra, CNP, Simone Miramontes, [facility], Noam Weston, CNP, Diane Barton PA-C, Anisa Jimenez [facility], Keith Harper, CNP, Andrea Black, CNP, Kaycee Wong, CNP, Chad Escobar, CNS, Amanda Cabral DNP, Julio Novak, CNP, Zamzam Mills, CNP, Loida Quiñones, CNP         [facility]    Progress Note    5/22/2022    12:16 PM    Name:   Velma Saleh  MRN:     [id number]     Acct:      [de-identified]   Room:   [address] [place] [address] Day:  2  Admit Date:  5/20/2022 10:14 AM    PCP:   Maria Barton DO  Code Status:  Full Code    Subjective:     C/C:   Chief Complaint   Patient presents with    Abdominal Pain     Interval History Status: improved    Has significant improvement in abdominal distention and pain  Patient had 3 bowel moments  Urine color improving  Tolerating diet    Brief History:   Per my partner-  This very pleasant 82-year-old male was brought to the hospital by his parents due to abdominal pain, nausea, vomiting. Imaging studies have shown a ileus. Clinically it appears that he has a acute cystitis without hematuria which is the most likely etiology behind his ileus. The patient has cerebral palsy and spastic paraplegia. He does require self-catheterization and appears to have a urinary tract infection secondary to self-catheterization. Ileus is likely secondary to self-catheterization.  Patient continues on  Rocephin along with IV fluids. Antiemetics have helped with his nausea. He still has some mild abdominal pain. He is relatively nonverbal but does smile and answer very simple questions. Family at the bedside provide majority of his history    Review of Systems:     Constitutional:  negative for chills, fevers, sweats  Respiratory:  negative for cough, dyspnea on exertion, shortness of breath, wheezing  Cardiovascular:  negative for chest pain, chest pressure/discomfort, lower extremity edema, palpitations  Gastrointestinal:  No for abdominal pain, no constipation,no diarrhea, nausea, vomiting  Neurological:  negative for dizziness, headache    Medications: Allergies: Allergies   Allergen Reactions    Sulfa Antibiotics Other (See Comments)     Fever    Vancomycin Rash     Red Man       Current Meds:   Scheduled Meds:    cephALEXin  500 mg Oral 2 times per day    sodium chloride flush  5-40 mL IntraVENous 2 times per day    enoxaparin  40 mg SubCUTAneous Daily    bisacodyl  10 mg Rectal Daily    divalproex  125 mg Oral Nightly    docusate sodium  100 mg Oral BID    gabapentin  200 mg Oral TID    levETIRAcetam  750 mg Oral BID    cetirizine  10 mg Oral Daily    melatonin  10 mg Oral Nightly    montelukast  10 mg Oral Nightly    pantoprazole  40 mg Oral QAM AC    trihexyphenidyl  4 mg Oral TID    clonazePAM  1 mg Oral TID     Continuous Infusions:    sodium chloride       PRN Meds: sodium chloride flush, sodium chloride flush, sodium chloride, potassium chloride **OR** potassium alternative oral replacement **OR** potassium chloride, magnesium sulfate, ondansetron **OR** ondansetron, polyethylene glycol, acetaminophen **OR** acetaminophen, albuterol sulfate HFA, diazePAM    Data:     Past Medical History:   has a past medical history of Cerebral palsy ([facility]) and Seizures ([facility] [address] Social History:   reports that he has never smoked.  He has never used smokeless tobacco. He reports that he does not drink alcohol and does not use drugs. Family History: History reviewed. No pertinent family history. Vitals:  /84   Pulse 73   Temp 98.4 °F (36.9 °C) (Oral)   Resp 16   Ht 5' 2\" (1.575 m)   Wt 131 lb (59.4 kg)   SpO2 94%   BMI 23.96 kg/m²   Temp (24hrs), Av.2 °F (36.8 °C), Min:97.9 °F (36.6 °C), Max:98.4 °F (36.9 °C)    No results for input(s): POCGLU in the last 72 hours. I/O (24Hr): Intake/Output Summary (Last 24 hours) at 2022 1216  Last data filed at 2022 1122  Gross per 24 hour   Intake 650 ml   Output 850 ml   Net -200 ml       Labs:  Hematology:  Recent Labs     22  1045 22  0517 22  0807   WBC 14.8* 8.7 5.7   RBC 6.07* 4.61 4.85   HGB 17.7* 13.4* 14.2   HCT 51.7 39.9* 42.0   MCV 85.1 86.5 86.7   MCH 29.1 29.1 29.3   MCHC 34.2 33.7 33.8   RDW 13.4 13.8 13.8    246 226   MPV 8.7 8.8 8.0   INR  --  1.2  --      Chemistry:  Recent Labs     22  1045 22  0517 22  0807   * 139 143   K 4.6 4.0 4.1   CL 93* 105 107   CO2 25 27 27   GLUCOSE 110* 87 89   BUN 35* 14 6   CREATININE 0.60* 0.44* <0.40*   ANIONGAP 16 7* 9   LABGLOM Pediatric GFR requires additional information. Refer to [facility] website for calculator. Pediatric GFR requires additional information. Refer to [facility] website for calculator.  Can not be calculated   GFRAA  --   --  Can not be calculated   CALCIUM 10.3 8.8 9.3     Recent Labs     22  1045   PROT 8.1   LABALBU 4.6   AST 22   ALT 25   ALKPHOS 93   BILITOT 0.59   BILIDIR 0.12   LIPASE 9*     ABG:No results found for: POCPH, PHART, PH, POCPCO2, SUB9OXU, PCO2, POCPO2, PO2ART, PO2, POCHCO3, YDI9IUJ, HCO3, NBEA, PBEA, BEART, BE, THGBART, THB, MLZ4XIY, VPWZ1KDD, D8SKSNSL, O2SAT, FIO2  Lab Results   Component Value Date/Time    SPECIAL NOT REPORTED 2022 07:30 AM     Lab Results   Component Value Date/Time    CULTURE ESCHERICHIA COLI 10 to 50,000 CFU/ML (A) 2022 11:46 AM       Radiology:  CT ABDOMEN PELVIS W IV CONTRAST Additional Contrast? None    Result Date: 5/20/2022  Multiple loops of air and fluid-filled large and small bowel with air in the rectum. This appears compatible with a generalized ileus.        Physical Examination:     General appearance:  alert, cooperative and no distress  Mental Status:  Alert, trying to talk  Lungs:normal breath sounds bilaterally, normal effort  Heart:  regular rate and rhythm, no murmur  Abdomen:  soft, nontender, nondistended, normal bowel sounds, no masses, hepatomegaly, splenomegaly, g tube in place  Extremities:  no edema, redness, tenderness in the calves  Skin:  no gross lesions, rashes, induration  quadriplegic  Assessment:     Hospital Problems           Last Modified POA    * (Principal) Ileus ([facility] [address] 5/20/2022 Yes    Acute cystitis without hematuria 5/20/2022 Yes    Neurogenic bladder 5/21/2022 Yes    Spastic quadriplegic cerebral palsy ([address] 5/20/2022 Yes    Other constipation 5/21/2022 Yes    History of recurrent UTIs 5/21/2022 Yes    Seizure disorder ([address] 5/20/2022 Yes    GERD (gastroesophageal reflux disease) 5/21/2022 Yes    History of spinal fusion for scoliosis 5/21/2022 Yes    Scoliosis 5/20/2022 Yes          Plan:     Ileus-if any improvement in patient's symptoms, patient has bowel sounds, 3 bowel movements, continue bowel regimen at home, continue dulcolax suppository    UTI-switch rocephin to Keflex, urine culture showing E COLI    Acute respiratory failure with new oxygen needs- resolved    Spastic quadriplegia with seizure history- continue home meds    Urinary retention- staright cath as needed    Pt/ot    Plan to discharge today    Hua Pedersen MD  5/22/2022  12:16 PM

## 2022-05-22 NOTE — PROGRESS NOTES
Pt ok for discharge surgery at bedside, pt parents denies questions with discharge instructions and verbalizes understanding.

## 2022-05-22 NOTE — DISCHARGE SUMMARY
by his parents due to abdominal pain, nausea, vomiting.  Imaging studies have shown a ileus.  Clinically it appears that he has a acute cystitis without hematuria which is the most likely etiology behind his ileus.  The patient has cerebral palsy and spastic paraplegia.  He does require self-catheterization and appears to have a urinary tract infection secondary to self-catheterization. Thelma Few is likely secondary to self-catheterization. Patient continues on  Rocephin along with IV fluids.      Significant therapeutic interventions:   Ileus-significant improvement in patient's symptoms with bowel regimen, patient has bowel sounds, 3 bowel movements, continue bowel regimen at home, continue dulcolax suppository     UTI-switch rocephin to Keflex, urine culture showing E COLI     Spastic quadriplegia with seizure history- continue home meds     Urinary retention- staright cath as needed    Significant Diagnostic Studies:   Labs / Micro:  CBC:   Lab Results   Component Value Date    WBC 5.7 05/22/2022    RBC 4.85 05/22/2022    HGB 14.2 05/22/2022    HCT 42.0 05/22/2022    MCV 86.7 05/22/2022    MCH 29.3 05/22/2022    MCHC 33.8 05/22/2022    RDW 13.8 05/22/2022     05/22/2022     BMP:    Lab Results   Component Value Date    GLUCOSE 89 05/22/2022     05/22/2022    K 4.1 05/22/2022     05/22/2022    CO2 27 05/22/2022    ANIONGAP 9 05/22/2022    BUN 6 05/22/2022    CREATININE <0.40 05/22/2022    BUNCRER NOT REPORTED 08/04/2021    CALCIUM 9.3 05/22/2022    LABGLOM Can not be calculated 05/22/2022    GFRAA Can not be calculated 05/22/2022    GFR      05/22/2022     HFP:    Lab Results   Component Value Date    PROT 8.1 05/20/2022     CMP:    Lab Results   Component Value Date    GLUCOSE 89 05/22/2022     05/22/2022    K 4.1 05/22/2022     05/22/2022    CO2 27 05/22/2022    BUN 6 05/22/2022    CREATININE <0.40 05/22/2022    ANIONGAP 9 05/22/2022    ALKPHOS 93 05/20/2022    ALT 25 05/20/2022 AST 22 05/20/2022    BILITOT 0.59 05/20/2022    LABALBU 4.6 05/20/2022    ALBUMIN 1.3 05/20/2022    LABGLOM Can not be calculated 05/22/2022    GFRAA Can not be calculated 05/22/2022    GFR      05/22/2022    PROT 8.1 05/20/2022    CALCIUM 9.3 05/22/2022        Radiology:  XR ABDOMEN (KUB) (SINGLE AP VIEW)    Result Date: 5/21/2022  Minimal improvement. CT ABDOMEN PELVIS W IV CONTRAST Additional Contrast? None    Result Date: 5/20/2022  Multiple loops of air and fluid-filled large and small bowel with air in the rectum. This appears compatible with a generalized ileus. XR CHEST PORTABLE    Result Date: 5/21/2022  No acute process. Consultations:    Consults:     Final Specialist Recommendations/Findings:   IP CONSULT TO GENERAL SURGERY      The patient was seen and examined on day of discharge and this discharge summary is in conjunction with any daily progress note from day of discharge. Discharge plan:     Disposition: Home    Physician Follow Up:     Irma Lubin [address] [place] [address] I-20, DO  [address]  [phone]    Schedule an appointment as soon as possible for a visit in 3 days         Requiring Further Evaluation/Follow Up POST HOSPITALIZATION/Incidental Findings: continue home medsfollow up with pcp    Diet: regular diet    Activity: As tolerated    Instructions to Patient: take keflex for 5 days    Discharge Medications:      Medication List      START taking these medications    cephALEXin 500 MG capsule  Commonly known as: KEFLEX  Take 1 capsule by mouth every 12 hours for 9 doses        CONTINUE taking these medications    albuterol sulfate  (90 Base) MCG/ACT inhaler     BACLOFEN (PAIN PUMP REFILL CHARGE)     bisacodyl 10 MG suppository  Commonly known as: DULCOLAX     Catheters Misc  16 Fr. Latex free, coude tip intermittent urinary catheters. Use as directed 4 times daily.      Claritin 10 MG tablet  Generic drug: loratadine     clonazePAM 0.5 MG tablet  Commonly known as: KLONOPIN     Cuvposa 1 MG/5ML Soln solution  Generic drug: glycopyrrolate     diazePAM 10 MG tablet  Commonly known as: VALIUM     divalproex 125 MG DR tablet  Commonly known as: DEPAKOTE  Take one tab at bedtime (on weaning down trial)     docusate 100 MG Caps  Commonly known as: COLACE, DULCOLAX     FLONASE NA     gabapentin 100 MG capsule  Commonly known as: NEURONTIN     levETIRAcetam 100 MG/ML solution  Commonly known as: KEPPRA  Take 7.5 ml twice daily     Melatonin 10 MG Tabs     montelukast 10 MG tablet  Commonly known as: SINGULAIR     MUCINEX ALLERGY PO     polyethylene glycol 17 GM/SCOOP powder  Commonly known as: GLYCOLAX     PRILOSEC PO     Sennosides 15 MG Tabs     trihexyphenidyl 2 MG tablet  Commonly known as: ARTANE        STOP taking these medications    nitrofurantoin (macrocrystal-monohydrate) 100 MG capsule  Commonly known as: MACROBID           Where to Get Your Medications      These medications were sent to [address], [address] [facility]  [address] [facility] [address]    Phone: [phone]   · cephALEXin 500 MG capsule         No discharge procedures on file. Time Spent on discharge is  34 mins in patient examination, evaluation, counseling as well as medication reconciliation, prescriptions for required medications, discharge plan and follow up. Electronically signed by   Varun Lorenz MD  5/22/2022  12:20 PM      Thank you Dr. Maria Barton DO for the opportunity to be involved in this patient's care.

## 2022-05-30 ENCOUNTER — HOSPITAL ENCOUNTER (OUTPATIENT)
Age: 22
Setting detail: SPECIMEN
Discharge: HOME | End: 2022-05-30
Payer: MEDICAID

## 2022-05-30 DIAGNOSIS — Z87.440 HISTORY OF RECURRENT UTIS: ICD-10-CM

## 2022-05-30 DIAGNOSIS — R33.9 URINARY RETENTION: ICD-10-CM

## 2022-05-30 LAB
-: NORMAL
BILIRUBIN URINE: NEGATIVE
CASTS UA: NORMAL /LPF (ref 0–8)
COLOR: YELLOW
EPITHELIAL CELLS UA: NORMAL /HPF (ref 0–5)
GLUCOSE URINE: NEGATIVE
KETONES, URINE: NEGATIVE
LEUKOCYTE ESTERASE, URINE: NEGATIVE
NITRITE, URINE: NEGATIVE
PH UA: 6 (ref 5–8)
PROTEIN UA: NEGATIVE
RBC UA: NORMAL /HPF (ref 0–4)
SPECIFIC GRAVITY UA: 1.01 (ref 1–1.03)
TURBIDITY: CLEAR
URINE HGB: NEGATIVE
UROBILINOGEN, URINE: NORMAL
WBC UA: NORMAL /HPF (ref 0–5)

## 2022-05-30 PROCEDURE — 87086 URINE CULTURE/COLONY COUNT: CPT

## 2022-05-30 PROCEDURE — 81001 URINALYSIS AUTO W/SCOPE: CPT

## 2022-05-31 LAB
CULTURE: NORMAL
SPECIMEN DESCRIPTION: NORMAL

## 2022-07-25 DIAGNOSIS — G80.0 SPASTIC QUADRIPLEGIC CEREBRAL PALSY (HCC): ICD-10-CM

## 2022-07-25 DIAGNOSIS — Z97.8 PRESENCE OF INTRATHECAL BACLOFEN PUMP: ICD-10-CM

## 2022-07-25 DIAGNOSIS — G40.909 SEIZURE DISORDER (HCC): ICD-10-CM

## 2022-07-25 RX ORDER — LEVETIRACETAM 100 MG/ML
SOLUTION ORAL
Qty: 450 ML | Refills: 1 | Status: SHIPPED | OUTPATIENT
Start: 2022-07-25 | End: 2022-08-03

## 2022-07-25 NOTE — TELEPHONE ENCOUNTER
Pharmacy requesting refill of Keppra 100 mg/ml.       Medication active on med list yes      Date of last Rx: 3/23/22 with 3 refills          verified by JANEEN HARPER      Date of last appointment 3/23/22    Next Visit Date:  7/26/2022

## 2022-07-26 ENCOUNTER — OFFICE VISIT (OUTPATIENT)
Dept: NEUROLOGY | Age: 22
End: 2022-07-26
Payer: MEDICARE

## 2022-07-26 VITALS
HEART RATE: 69 BPM | BODY MASS INDEX: 24.11 KG/M2 | WEIGHT: 131 LBS | DIASTOLIC BLOOD PRESSURE: 81 MMHG | HEIGHT: 62 IN | SYSTOLIC BLOOD PRESSURE: 124 MMHG

## 2022-07-26 DIAGNOSIS — G40.909 SEIZURE DISORDER (HCC): Primary | ICD-10-CM

## 2022-07-26 DIAGNOSIS — G80.0 SPASTIC QUADRIPLEGIC CEREBRAL PALSY (HCC): ICD-10-CM

## 2022-07-26 PROCEDURE — 1036F TOBACCO NON-USER: CPT | Performed by: STUDENT IN AN ORGANIZED HEALTH CARE EDUCATION/TRAINING PROGRAM

## 2022-07-26 PROCEDURE — 99215 OFFICE O/P EST HI 40 MIN: CPT | Performed by: STUDENT IN AN ORGANIZED HEALTH CARE EDUCATION/TRAINING PROGRAM

## 2022-07-26 PROCEDURE — G8427 DOCREV CUR MEDS BY ELIG CLIN: HCPCS | Performed by: STUDENT IN AN ORGANIZED HEALTH CARE EDUCATION/TRAINING PROGRAM

## 2022-07-26 PROCEDURE — G8420 CALC BMI NORM PARAMETERS: HCPCS | Performed by: STUDENT IN AN ORGANIZED HEALTH CARE EDUCATION/TRAINING PROGRAM

## 2022-07-26 RX ORDER — QUETIAPINE FUMARATE 50 MG/1
50 TABLET, EXTENDED RELEASE ORAL NIGHTLY
COMMUNITY
Start: 2022-07-11

## 2022-07-26 ASSESSMENT — ENCOUNTER SYMPTOMS
GASTROINTESTINAL NEGATIVE: 1
RESPIRATORY NEGATIVE: 1
EYES NEGATIVE: 1

## 2022-07-26 NOTE — PROGRESS NOTES
[facility]  [address] [address]  [facility] [address]  Dept: [phone]    PATIENT NAME: Maryuri Altman  PATIENT MRN: [id number]  PRIMARY CARE PHYSICIAN: Eric Killian DO    HPI:      Maryuri Altman is a 23 y.o. male who presents to clinic today for follow up of Seizures     Patient was last seen in clinic on March 23, 2022. Patient is transitioning care from [address] [place] [address]. Patient has a history of seizure disorder and spastic cerebral palsy. The clinic with his mom Ms. Cheko Johnson. He is been being tapered off of Depakote since the summer 2021. Been seizure-free since 2012. At last clinic visit patient's Depakote was tapered down further to 125 mg daily and plan was to stop it in June altogether. Did wean completely off of the Depakote and first week of June 2022. He is still on Keppra 750 mg twice a day as well as clonazepam 0.5 mg in the morning , 0.5 mg in the afternoon and 1 mg at p.m. (clonazepam is for spacticity). Mother knows that he has more behavioral issues since tapering off Depakote so PCP started him on Seroquel 50 mg at night. Of note, with higher doses of Depakote patient had cognitive issues. For his spasticity, he is on a baclofen pump  and clonazepam which is being managed by a physiatrist at [facility] clinic. PREVIOUS WORKUP:     - Reviewed results of prior labs and imaging.     Current Outpatient Medications   Medication Sig Dispense Refill    QUEtiapine (SEROQUEL XR) 50 MG extended release tablet Take 50 mg by mouth nightly      levETIRAcetam (KEPPRA) 100 MG/ML solution TAKE 7.5 ML BY MOUTH TWICE A  mL 1    bisacodyl (DULCOLAX) 10 MG suppository Place 10 mg rectally      docusate (COLACE, DULCOLAX) 100 MG CAPS Take 100 mg by mouth 3 times daily as needed      Sennosides 15 MG TABS Take 1 tablet by mouth daily      Fluticasone Propionate (FLONASE NA) by Nasal route      Fexofenadine HCl (MUCINEX ALLERGY PO) Take by mouth      diazePAM (VALIUM) 10 MG tablet Take 5 mg by mouth as needed. clonazePAM (KLONOPIN) 0.5 MG tablet 1 mg 3 times daily. 0.5 am and 0.5noon  (2 )0.5 mg at night      gabapentin (NEURONTIN) 100 MG capsule Take 100 mg by mouth 3 times daily. 200mg Morning and evening  100mg afternoon      albuterol sulfate  (90 Base) MCG/ACT inhaler Inhale 2 puffs into the lungs every 4 hours as needed      polyethylene glycol (GLYCOLAX) 17 GM/SCOOP powder Take 17 g by mouth daily      Omeprazole (PRILOSEC PO) Take by mouth      Catheters MISC 16 Fr. Latex free, coude tip intermittent urinary catheters. Use as directed 4 times daily. 120 each 11    trihexyphenidyl (ARTANE) 2 MG tablet Take 4 mg by mouth 3 times daily      loratadine (CLARITIN) 10 MG tablet Take 10 mg by mouth daily      montelukast (SINGULAIR) 10 MG tablet Take 10 mg by mouth nightly      Melatonin 10 MG TABS Take 10 mg by mouth nightly      BACLOFEN, PAIN PUMP REFILL CHARGE,       divalproex (DEPAKOTE) 125 MG DR tablet Take one tab at bedtime (on weaning down trial) (Patient not taking: Reported on 7/26/2022) 30 tablet 2    glycopyrrolate (CUVPOSA) 1 MG/5ML SOLN solution Take 900 mcg by mouth 3 times daily (Patient not taking: No sig reported)       No current facility-administered medications for this visit. REVIEW OF SYSTEMS:     Review of Systems   Unable to perform ROS: Other   Constitutional: Negative. HENT: Negative. Eyes: Negative. Respiratory: Negative. Cardiovascular: Negative. Gastrointestinal: Negative. Endocrine: Negative. Genitourinary: Negative. Musculoskeletal: Negative. Skin: Negative. Neurological:  Positive for seizures. Negative for dizziness, tremors, syncope, facial asymmetry, speech difficulty, weakness, light-headedness, numbness and headaches. Hematological: Negative. Psychiatric/Behavioral:  Positive for behavioral problems and decreased concentration. Negative for sleep disturbance.        NEUROLOGICAL EXAMINATION:   VITALS  /81 (Site: Left Upper Arm, Position: Sitting, Cuff Size: Medium Adult)   Pulse 69   Ht 5' 2\" (1.575 m)   Wt 131 lb (59.4 kg)   BMI 23.96 kg/m²      NEUROLOGIC EXAMINATION  GENERAL  Appears comfortable and in no distress with orofacial dystonia   HEENT  NC/ AT   NECK  Supple and no bruits heard   MENTAL STATUS:  alert to self and place, does not know month   CRANIAL NERVES: II     -      Pupils bilaterally reactive; blinks to threat bilaterally  III,IV,VI -right gaze preference; no afferent defect, no SHELLEY, no ptosis  V     -     Normal facial sensation  VII    -     Normal facial symmetry  VIII   -     Intact hearing  IX,X -     Symmetrical palate  XI    -     Reduced shoulder shrug bilaterally  XII   -     Midline tongue, no atrophy   MOTOR FUNCTION:  significant for mild weakness of grade 4+/5 in left upper extremity; moderate weakness of grade 4-/5 in right hand intrinsics; 0/5 weakness in bilateral lower extremities with flexion contractures at both knees and ankles with spasticity in lower extremities   SENSORY FUNCTION:  Normal touch, normal pin, normal vibration, normal proprioception   CEREBELLAR FUNCTION:  No tremors and significantly impaired fine motor control over upper limbs   REFLEX FUNCTION:  Symmetric, no perverted reflex, mute plantars   STATION and GAIT  wheel chair bound. ASSESSMENT / PLAN:   Sara Nair is a 23 y.o. male that presents to neurology clinic for follow-up visit for seizures and spastic cerebral palsy. Patient successfully has weaned off of Depakote since June 2022 and has had no seizures since 2012. Discussed with mother Laure Montenegro today that I would like to continue the Keppra 750 twice a day for now but we can consider tapering  down the dose to 500 twice a day at the next visit if he still seizure-free.   Continues to have further behavioral issues as not controlled on Seroquel we can consider starting a low-dose of Depakote continuing the Keppra 500 mg twice a day in the future. Plan:  Continue Keppra 750 mg twice a day, discussed we can consider tapering  Keppra in the future. Follow up in 3 months.      .leslye Maddox MD   Neurology & Sleep Medicine  [facility]

## 2022-08-03 DIAGNOSIS — G80.0 SPASTIC QUADRIPLEGIC CEREBRAL PALSY (HCC): ICD-10-CM

## 2022-08-03 DIAGNOSIS — Z97.8 PRESENCE OF INTRATHECAL BACLOFEN PUMP: ICD-10-CM

## 2022-08-03 DIAGNOSIS — G40.909 SEIZURE DISORDER (HCC): ICD-10-CM

## 2022-08-03 RX ORDER — LEVETIRACETAM 100 MG/ML
SOLUTION ORAL
Qty: 473 ML | Refills: 1 | OUTPATIENT
Start: 2022-08-03 | End: 2022-09-20

## 2022-08-03 NOTE — TELEPHONE ENCOUNTER
Nancy pharmacist at [facility]. called. They are requesting a quantity of 473 ml for 31 days supply. That way he will get a full bottle. I gave her a verbal okay.

## 2022-08-04 ENCOUNTER — HOSPITAL ENCOUNTER (OUTPATIENT)
Age: 22
Setting detail: SPECIMEN
Discharge: HOME | End: 2022-08-04

## 2022-08-04 DIAGNOSIS — Z87.440 HISTORY OF RECURRENT UTIS: ICD-10-CM

## 2022-08-04 LAB
BACTERIA: ABNORMAL
BILIRUBIN URINE: NEGATIVE
CASTS UA: ABNORMAL /LPF (ref 0–8)
COLOR: YELLOW
EPITHELIAL CELLS UA: ABNORMAL /HPF (ref 0–5)
GLUCOSE URINE: NEGATIVE
KETONES, URINE: ABNORMAL
LEUKOCYTE ESTERASE, URINE: ABNORMAL
NITRITE, URINE: POSITIVE
PH UA: 6 (ref 5–8)
PROTEIN UA: ABNORMAL
RBC UA: ABNORMAL /HPF (ref 0–4)
SPECIFIC GRAVITY UA: 1.02 (ref 1–1.03)
TURBIDITY: ABNORMAL
URINE HGB: NEGATIVE
UROBILINOGEN, URINE: NORMAL
WBC UA: ABNORMAL /HPF (ref 0–5)

## 2022-08-05 LAB
CULTURE: ABNORMAL
SPECIMEN DESCRIPTION: ABNORMAL

## 2022-08-08 NOTE — RESULT ENCOUNTER NOTE
Urine culture positive for infection. We will plan to continue Keflex treatment.  Kreixt message sent to family

## 2022-08-26 ENCOUNTER — HOSPITAL ENCOUNTER (OUTPATIENT)
Age: 22
Setting detail: SPECIMEN
Discharge: HOME | End: 2022-08-26
Payer: MEDICARE

## 2022-08-26 DIAGNOSIS — N39.0 RECURRENT UTI: ICD-10-CM

## 2022-08-26 LAB
BACTERIA: ABNORMAL
BILIRUBIN URINE: NEGATIVE
CASTS UA: ABNORMAL /LPF (ref 0–8)
COLOR: YELLOW
EPITHELIAL CELLS UA: ABNORMAL /HPF (ref 0–5)
GLUCOSE URINE: NEGATIVE
KETONES, URINE: NEGATIVE
LEUKOCYTE ESTERASE, URINE: ABNORMAL
NITRITE, URINE: POSITIVE
PH UA: 7.5 (ref 5–8)
PROTEIN UA: ABNORMAL
RBC UA: ABNORMAL /HPF (ref 0–4)
SPECIFIC GRAVITY UA: 1.03 (ref 1–1.03)
TURBIDITY: ABNORMAL
URINE HGB: NEGATIVE
UROBILINOGEN, URINE: NORMAL
WBC UA: ABNORMAL /HPF (ref 0–5)

## 2022-08-26 PROCEDURE — 87086 URINE CULTURE/COLONY COUNT: CPT

## 2022-08-26 PROCEDURE — 87186 SC STD MICRODIL/AGAR DIL: CPT

## 2022-08-26 PROCEDURE — 81001 URINALYSIS AUTO W/SCOPE: CPT

## 2022-08-26 PROCEDURE — 87077 CULTURE AEROBIC IDENTIFY: CPT

## 2022-08-28 LAB
CULTURE: ABNORMAL
SPECIMEN DESCRIPTION: ABNORMAL

## 2022-09-20 DIAGNOSIS — Z97.8 PRESENCE OF INTRATHECAL BACLOFEN PUMP: ICD-10-CM

## 2022-09-20 DIAGNOSIS — G40.909 SEIZURE DISORDER (HCC): ICD-10-CM

## 2022-09-20 DIAGNOSIS — G80.0 SPASTIC QUADRIPLEGIC CEREBRAL PALSY (HCC): ICD-10-CM

## 2022-09-20 RX ORDER — LEVETIRACETAM 100 MG/ML
SOLUTION ORAL
Qty: 473 ML | Refills: 1 | Status: SHIPPED | OUTPATIENT
Start: 2022-09-20

## 2022-09-20 NOTE — TELEPHONE ENCOUNTER
Pharmacy requesting refill of Keppra 100 mg.       Medication active on med list yes      Date of last Rx: 8/3/2022 with 1 refills          verified by JANEEN HARPER      Date of last appointment 7/26/2022    Next Visit Date:  11/1/2022

## 2022-09-23 ENCOUNTER — HOSPITAL ENCOUNTER (OUTPATIENT)
Dept: CT IMAGING | Age: 22
Discharge: HOME | End: 2022-09-25
Payer: MEDICARE

## 2022-09-23 DIAGNOSIS — R29.3 ABNORMAL POSTURE: ICD-10-CM

## 2022-09-23 PROCEDURE — 76376 3D RENDER W/INTRP POSTPROCES: CPT

## 2022-09-23 PROCEDURE — 72131 CT LUMBAR SPINE W/O DYE: CPT

## 2022-11-01 ENCOUNTER — OFFICE VISIT (OUTPATIENT)
Dept: NEUROLOGY | Age: 22
End: 2022-11-01
Payer: MEDICARE

## 2022-11-01 VITALS — SYSTOLIC BLOOD PRESSURE: 142 MMHG | DIASTOLIC BLOOD PRESSURE: 90 MMHG | HEART RATE: 88 BPM

## 2022-11-01 DIAGNOSIS — Z97.8 PRESENCE OF INTRATHECAL BACLOFEN PUMP: ICD-10-CM

## 2022-11-01 DIAGNOSIS — G80.0 SPASTIC QUADRIPLEGIC CEREBRAL PALSY (HCC): ICD-10-CM

## 2022-11-01 DIAGNOSIS — G40.909 SEIZURE DISORDER (HCC): Primary | ICD-10-CM

## 2022-11-01 DIAGNOSIS — Z51.81 MEDICATION MONITORING ENCOUNTER: ICD-10-CM

## 2022-11-01 PROCEDURE — G8420 CALC BMI NORM PARAMETERS: HCPCS | Performed by: STUDENT IN AN ORGANIZED HEALTH CARE EDUCATION/TRAINING PROGRAM

## 2022-11-01 PROCEDURE — G8427 DOCREV CUR MEDS BY ELIG CLIN: HCPCS | Performed by: STUDENT IN AN ORGANIZED HEALTH CARE EDUCATION/TRAINING PROGRAM

## 2022-11-01 PROCEDURE — G8484 FLU IMMUNIZE NO ADMIN: HCPCS | Performed by: STUDENT IN AN ORGANIZED HEALTH CARE EDUCATION/TRAINING PROGRAM

## 2022-11-01 PROCEDURE — 99214 OFFICE O/P EST MOD 30 MIN: CPT | Performed by: STUDENT IN AN ORGANIZED HEALTH CARE EDUCATION/TRAINING PROGRAM

## 2022-11-01 PROCEDURE — 1036F TOBACCO NON-USER: CPT | Performed by: STUDENT IN AN ORGANIZED HEALTH CARE EDUCATION/TRAINING PROGRAM

## 2022-11-01 RX ORDER — NITROFURANTOIN 25; 75 MG/1; MG/1
CAPSULE ORAL
COMMUNITY
Start: 2018-11-13

## 2022-11-01 RX ORDER — LAMOTRIGINE 25 MG/1
TABLET ORAL
Qty: 30 TABLET | Refills: 3 | Status: SHIPPED | OUTPATIENT
Start: 2022-11-01 | End: 2022-11-28 | Stop reason: SDUPTHER

## 2022-11-01 ASSESSMENT — ENCOUNTER SYMPTOMS
EYES NEGATIVE: 1
RESPIRATORY NEGATIVE: 1
GASTROINTESTINAL NEGATIVE: 1

## 2022-11-01 NOTE — PROGRESS NOTES
[id number] [facility] [address]  Dept: [phone]    PATIENT NAME: Stephane Lockhart  PATIENT MRN: [id number]  PRIMARY CARE PHYSICIAN: Curt Monge DO    HPI:      Stephane Lockhart is a 23 y.o. male who presents to clinic today for follow up of Seizures     Interim History:  Patient is currently on Keppra 750 mg twice a day for seizures. He has been seizure-free since 2012. For his spasticity, he is on a baclofen pump  and clonazepam which is being managed by a physiatrist at [facility]. He recently had a new baclofen pump placed in 10/11/22 and has been doing well. Mother notes he is doing better from a mood perspective. He notes he has flare ups about once a week instead of everyday. Mother is interested in weaning off of the [address] given it might be contributing to mood issues. Prior History:  Patient was last seen in clinic on March 23, 2022. Patient is transitioning care from [address] [place] [address]. Patient has a history of seizure disorder and spastic cerebral palsy. The clinic with his mom Ms. Derrell Calloway. He is been being tapered off of Depakote since the summer 2021. Been seizure-free since 2012. At last clinic visit patient's Depakote was tapered down further to 125 mg daily and plan was to stop it in June altogether. Did wean completely off of the Depakote and first week of June 2022. He is still on Keppra 750 mg twice a day as well as clonazepam 0.5 mg in the morning , 0.5 mg in the afternoon and 1 mg at p.m. (clonazepam is for spacticity). Mother knows that he has more behavioral issues since tapering off Depakote so PCP started him on Seroquel 50 mg at night. Of note, with higher doses of Depakote patient had cognitive issues. For his spasticity, he is on a baclofen pump  and clonazepam which is being managed by a physiatrist at [facility]. PREVIOUS WORKUP:     - Reviewed results of prior labs and imaging.     Current Outpatient Medications   Medication Sig Dispense Refill    nitrofurantoin, macrocrystal-monohydrate, (MACROBID) 100 MG capsule Take by mouth      levETIRAcetam (KEPPRA) 100 MG/ML solution TAKE 7.5 ML BY MOUTH TWICE A  mL 1    QUEtiapine (SEROQUEL XR) 50 MG extended release tablet Take 50 mg by mouth nightly      bisacodyl (DULCOLAX) 10 MG suppository Place 10 mg rectally      docusate (COLACE, DULCOLAX) 100 MG CAPS Take 100 mg by mouth 3 times daily as needed      Sennosides 15 MG TABS Take 1 tablet by mouth daily      Fluticasone Propionate (FLONASE NA) by Nasal route      Fexofenadine HCl (MUCINEX ALLERGY PO) Take by mouth      diazePAM (VALIUM) 10 MG tablet Take 5 mg by mouth as needed. clonazePAM (KLONOPIN) 0.5 MG tablet 1 mg 3 times daily. 0.5 am and 0.5noon  (2 )0.5 mg at night      gabapentin (NEURONTIN) 100 MG capsule Take 200 mg by mouth in the morning and 200 mg in the evening. 200mg BID.      albuterol sulfate  (90 Base) MCG/ACT inhaler Inhale 2 puffs into the lungs every 4 hours as needed      polyethylene glycol (GLYCOLAX) 17 GM/SCOOP powder Take 17 g by mouth daily      Omeprazole (PRILOSEC PO) Take by mouth      Catheters MISC 16 Fr. Latex free, coude tip intermittent urinary catheters. Use as directed 4 times daily. 120 each 11    trihexyphenidyl (ARTANE) 2 MG tablet Take 4 mg by mouth 3 times daily      loratadine (CLARITIN) 10 MG tablet Take 10 mg by mouth daily      montelukast (SINGULAIR) 10 MG tablet Take 10 mg by mouth nightly      Melatonin 10 MG TABS Take 10 mg by mouth nightly      BACLOFEN, PAIN PUMP REFILL CHARGE,       glycopyrrolate (CUVPOSA) 1 MG/5ML SOLN solution Take 900 mcg by mouth 3 times daily (Patient not taking: No sig reported)       No current facility-administered medications for this visit. REVIEW OF SYSTEMS:     Review of Systems   Unable to perform ROS: Other   Constitutional: Negative. HENT: Negative. Eyes: Negative. Respiratory: Negative. Cardiovascular: Negative. Gastrointestinal: Negative. Endocrine: Negative. Genitourinary: Negative. Musculoskeletal: Negative. Skin: Negative. Neurological:  Positive for seizures. Negative for dizziness, tremors, syncope, facial asymmetry, speech difficulty, weakness, light-headedness, numbness and headaches. Hematological: Negative. Psychiatric/Behavioral:  Positive for behavioral problems and decreased concentration. Negative for sleep disturbance. 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   HEENT  NC/ AT   NECK  Supple and no bruits heard   MENTAL STATUS:  alert to self and place, does not know month   CRANIAL NERVES: II     -      Pupils bilaterally reactive; blinks to threat bilaterally  III,IV,VI -right gaze preference; no afferent defect, no SHELLEY, no ptosis  V     -     Normal facial sensation  VII    -     Normal facial symmetry  VIII   -     Intact hearing  IX,X -     Symmetrical palate  XI    -     Reduced shoulder shrug bilaterally  XII   -     Midline tongue, no atrophy   MOTOR FUNCTION:  significant for mild weakness of grade 4+/5 in left upper extremity; moderate weakness of grade 4-/5 in right hand intrinsics; 0/5 weakness in bilateral lower extremities with flexion contractures at both knees and ankles with spasticity in lower extremities   SENSORY FUNCTION:  Normal touch, normal pin, normal vibration, normal proprioception   CEREBELLAR FUNCTION:  No tremors and significantly impaired fine motor control over upper limbs   REFLEX FUNCTION:  Symmetric, no perverted reflex, mute plantars   STATION and GAIT  wheel chair bound. ASSESSMENT / PLAN:   Tere Angulo is a 23 y.o. male that presents to neurology clinic for follow-up visit for seizures and spastic cerebral palsy.   Patient successfully has weaned off of Depakote since June 2022 and has had no seizures since 2012. Is currently on Keppra 750 mg twice a day for seizure prophylaxis. Discussed we can start weaning off the [address] if it is contributing to mood issues and start lamotrigine for seizure prophylaxis. We will decrease Keppra to 250 mg twice a day over the course of a month and start lamotrigine and uptitrate slowly to 50 mg twice a day. Side effect profile discussed with patient and his mother in detail. Casper at next visit we can continue to further wean down the Keppra and uptitrate on lamotrigine if needed. Plan:  Decrease keppra to 500 mg twice a day for two weeks. After two weeks decrease keppra to 250 mg twice a day.    Start lamotrigine:  Lamotrigine 25 mg once daily X1 week   Lamotrigine 25 mg BID x 1 week   Lamotrigine 25 mg am + 50 mg HS x 1 week  Lamotrigine 50 mg BID      Emani Lafleur MD   Neurology & [facility]

## 2022-11-01 NOTE — PATIENT INSTRUCTIONS
Decrease keppra to 500 mg twice a day for two weeks. After two weeks decrease keppra to 250 mg twice a day.      Start Lamictal  Lamotrigine 25 mg once daily X1 week   Lamotrigine 25 mg BID x 1 week   Lamotrigine 25 mg am + 50 mg HS x 1 week  Lamotrigine 50 mg BID

## 2022-11-14 RX ORDER — LAMOTRIGINE 25 MG/1
TABLET ORAL
Qty: 70 TABLET | OUTPATIENT
Start: 2022-11-14

## 2022-11-28 RX ORDER — LAMOTRIGINE 25 MG/1
TABLET ORAL
Qty: 120 TABLET | Refills: 3 | Status: SHIPPED | OUTPATIENT
Start: 2022-11-28

## 2022-11-28 NOTE — TELEPHONE ENCOUNTER
Patient's mother Sabrina Curiel called the office requesting a new Lamotrigine Rx. She stated that Zelalem Garcia has titrated up to 50 mg BID and doing well. Previous Rx given was only for #30 tablets.         Medication active on med list: yes      Date of last fill: 11/1/22 for #30 and 3 refills  verified on 11/28/2022    verified by Chapo Moran LPN      Date of last appointment: 11/1/2022    Next Visit Date:  Visit date not found

## 2022-12-05 ENCOUNTER — HOSPITAL ENCOUNTER (OUTPATIENT)
Age: 22
Setting detail: SPECIMEN
Discharge: HOME | End: 2022-12-05
Payer: MEDICARE

## 2022-12-05 DIAGNOSIS — N39.0 RECURRENT UTI: ICD-10-CM

## 2022-12-05 DIAGNOSIS — N31.9 NEUROGENIC BLADDER: ICD-10-CM

## 2022-12-05 PROCEDURE — 87086 URINE CULTURE/COLONY COUNT: CPT

## 2022-12-06 LAB
CULTURE: NORMAL
SPECIMEN DESCRIPTION: NORMAL

## 2022-12-12 ENCOUNTER — HOSPITAL ENCOUNTER (OUTPATIENT)
Dept: ULTRASOUND IMAGING | Age: 22
Discharge: HOME | End: 2022-12-14
Payer: MEDICARE

## 2022-12-12 DIAGNOSIS — N31.9 NEUROGENIC BLADDER: ICD-10-CM

## 2022-12-12 PROCEDURE — 76770 US EXAM ABDO BACK WALL COMP: CPT

## 2022-12-21 ENCOUNTER — HOSPITAL ENCOUNTER (OUTPATIENT)
Dept: GENERAL RADIOLOGY | Age: 22
Discharge: HOME | End: 2022-12-23
Payer: MEDICARE

## 2022-12-21 ENCOUNTER — HOSPITAL ENCOUNTER (OUTPATIENT)
Age: 22
Discharge: HOME | End: 2022-12-23

## 2022-12-21 DIAGNOSIS — K59.09 CHRONIC CONSTIPATION: ICD-10-CM

## 2022-12-21 PROCEDURE — 6360000004 HC RX CONTRAST MEDICATION: Performed by: NURSE PRACTITIONER

## 2022-12-21 PROCEDURE — 74018 RADEX ABDOMEN 1 VIEW: CPT

## 2022-12-21 PROCEDURE — 74270 X-RAY XM COLON 1CNTRST STD: CPT

## 2022-12-21 RX ADMIN — DIATRIZOATE MEGLUMINE AND DIATRIZOATE SODIUM 480 ML: 660; 100 LIQUID ORAL; RECTAL at 10:24

## 2023-03-27 DIAGNOSIS — G40.909 SEIZURE DISORDER (HCC): Primary | ICD-10-CM

## 2023-03-28 RX ORDER — LAMOTRIGINE 25 MG/1
50 TABLET ORAL 2 TIMES DAILY
Qty: 120 TABLET | Refills: 2 | Status: SHIPPED | OUTPATIENT
Start: 2023-03-28 | End: 2023-06-26

## 2023-03-28 NOTE — TELEPHONE ENCOUNTER
Pharmacy requesting refill of Lamictal.      Medication active on med list yes      Date of last fill: 11/28/22  verified on 3/28/2023   verified by [facility] LPN      Date of last appointment 11/1/22 with you    Next Visit Date:  4/5/23 with Dr Tab Robertson, wanted to stay in [facility]

## 2023-03-31 ENCOUNTER — HOSPITAL ENCOUNTER (OUTPATIENT)
Age: 23
Setting detail: SPECIMEN
Discharge: HOME | End: 2023-03-31
Payer: MEDICARE

## 2023-03-31 LAB
BILIRUBIN URINE: NEGATIVE
COLOR: YELLOW
GLUCOSE UR STRIP.AUTO-MCNC: NEGATIVE MG/DL
KETONES UR STRIP.AUTO-MCNC: NEGATIVE MG/DL
LEUKOCYTE ESTERASE UR QL STRIP.AUTO: ABNORMAL
NITRITE UR QL STRIP.AUTO: NEGATIVE
PROT UR STRIP.AUTO-MCNC: 8.5 MG/DL (ref 5–8)
PROT UR STRIP.AUTO-MCNC: NEGATIVE MG/DL
SPECIFIC GRAVITY UA: 1.01 (ref 1–1.03)
TURBIDITY: CLEAR
URINE HGB: ABNORMAL
UROBILINOGEN, URINE: NORMAL

## 2023-03-31 PROCEDURE — 87186 SC STD MICRODIL/AGAR DIL: CPT

## 2023-03-31 PROCEDURE — 87077 CULTURE AEROBIC IDENTIFY: CPT

## 2023-03-31 PROCEDURE — 81001 URINALYSIS AUTO W/SCOPE: CPT

## 2023-03-31 PROCEDURE — 87086 URINE CULTURE/COLONY COUNT: CPT

## 2023-04-01 LAB
BACTERIA: ABNORMAL
EPITHELIAL CELLS UA: ABNORMAL /HPF (ref 0–5)
MUCUS: ABNORMAL
RBC CLUMPS #/AREA URNS AUTO: ABNORMAL /HPF (ref 0–2)
WBC UA: ABNORMAL /HPF (ref 0–5)

## 2023-04-02 LAB
MICROORGANISM SPEC CULT: ABNORMAL
SPECIMEN DESCRIPTION: ABNORMAL

## 2023-04-05 ENCOUNTER — OFFICE VISIT (OUTPATIENT)
Dept: NEUROLOGY | Age: 23
End: 2023-04-05
Payer: MEDICARE

## 2023-04-05 VITALS
BODY MASS INDEX: 21.35 KG/M2 | HEART RATE: 86 BPM | SYSTOLIC BLOOD PRESSURE: 127 MMHG | HEIGHT: 62 IN | WEIGHT: 116 LBS | DIASTOLIC BLOOD PRESSURE: 78 MMHG

## 2023-04-05 DIAGNOSIS — G80.0 SPASTIC QUADRIPLEGIC CEREBRAL PALSY (HCC): ICD-10-CM

## 2023-04-05 DIAGNOSIS — F39 MOOD DISORDER (HCC): ICD-10-CM

## 2023-04-05 DIAGNOSIS — G40.909 SEIZURE DISORDER (HCC): Primary | ICD-10-CM

## 2023-04-05 DIAGNOSIS — N31.9 NEUROGENIC BLADDER: ICD-10-CM

## 2023-04-05 PROCEDURE — 99215 OFFICE O/P EST HI 40 MIN: CPT | Performed by: PSYCHIATRY & NEUROLOGY

## 2023-04-05 PROCEDURE — 1036F TOBACCO NON-USER: CPT | Performed by: PSYCHIATRY & NEUROLOGY

## 2023-04-05 PROCEDURE — G8420 CALC BMI NORM PARAMETERS: HCPCS | Performed by: PSYCHIATRY & NEUROLOGY

## 2023-04-05 PROCEDURE — G8427 DOCREV CUR MEDS BY ELIG CLIN: HCPCS | Performed by: PSYCHIATRY & NEUROLOGY

## 2023-04-05 RX ORDER — LAMOTRIGINE 25 MG/1
TABLET ORAL
Qty: 180 TABLET | Refills: 1 | Status: SHIPPED | OUTPATIENT
Start: 2023-04-05

## 2023-04-05 RX ORDER — CEPHALEXIN 500 MG/1
500 CAPSULE ORAL 3 TIMES DAILY
COMMUNITY
Start: 2023-04-03

## 2023-04-05 ASSESSMENT — ENCOUNTER SYMPTOMS
GASTROINTESTINAL NEGATIVE: 1
EYES NEGATIVE: 1
RESPIRATORY NEGATIVE: 1

## 2023-04-05 NOTE — PROGRESS NOTES
[address] [place] [address]  Dept: [phone]    PATIENT NAME: Jayson Rivas  PATIENT MRN: [id number]  PRIMARY CARE PHYSICIAN: Jasmyne Bartholomew DO    HPI:      Jayson Rivas is a 23 y.o. male who presents to clinic today for follow up of Seizures     Interim History:  Regarding seizure disorder; he has remained seizure-free since 2012. Because of mood swings with anger issues; he is on slightly decreased dose of Keppra and also was initiated on Lamictal.  Regarding his spasticity; he is continued on baclofen pump and clonazepam and being managed by a physiatrist at [facility]. He also has had baclofen pump managed by [facility]. Patient's mom also stated that patient underwent chemo denervation. 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 Has had bladder problems. Patient tolerated procedures well without any complications. Prior History:  Patient was last seen in clinic on March 23, 2022. Patient is transitioning care from [address] [place] [address]. Patient has a history of seizure disorder and spastic cerebral palsy. The clinic with his mom Ms. Nicolas Gray. He is been being tapered off of Depakote since the summer 2021. Been seizure-free since 2012. At last clinic visit patient's Depakote was tapered down further to 125 mg daily and plan was to stop it in June altogether. Did wean completely off of the Depakote and first week of June 2022. He is still on Keppra 750 mg twice a day as well as clonazepam 0.5 mg in the morning , 0.5 mg in the afternoon and 1 mg at p.m. (clonazepam is for spacticity). Mother knows that he has more behavioral issues since tapering off Depakote so PCP started him on Seroquel 50 mg at night. Of note, with higher doses of Depakote patient had cognitive issues. For his spasticity, he is on a baclofen pump  and clonazepam which is being managed by a physiatrist at [facility] clinic.   PREVIOUS WORKUP:     - Reviewed results of

## 2023-04-24 DIAGNOSIS — Z97.8 PRESENCE OF INTRATHECAL BACLOFEN PUMP: ICD-10-CM

## 2023-04-24 DIAGNOSIS — G40.909 SEIZURE DISORDER (HCC): ICD-10-CM

## 2023-04-24 DIAGNOSIS — G80.0 SPASTIC QUADRIPLEGIC CEREBRAL PALSY (HCC): ICD-10-CM

## 2023-04-24 NOTE — TELEPHONE ENCOUNTER
Sabrina Curiel called the office and stated that Zelalem Garcia was started on Lamictal 3 tabs twice per day and has decreased Keppra  2.5 ml in am and 5 ml at night and doing great. Sabrina Curiel asked for a paper script for the lamictal while they are traveling incase something should come about. She would be able to pick this up at the [facility] location. A new prescription requested for the Keppra suspension with new dosage/direction. Thank you.

## 2023-04-26 RX ORDER — LEVETIRACETAM 100 MG/ML
SOLUTION ORAL
Qty: 473 ML | Refills: 1 | Status: SHIPPED | OUTPATIENT
Start: 2023-04-26

## 2023-05-22 DIAGNOSIS — G40.909 SEIZURE DISORDER (HCC): ICD-10-CM

## 2023-05-22 RX ORDER — LAMOTRIGINE 25 MG/1
TABLET ORAL
Qty: 90 TABLET | Refills: 2 | Status: SHIPPED | OUTPATIENT
Start: 2023-05-22

## 2023-05-22 NOTE — TELEPHONE ENCOUNTER
Pharmacy requesting refill of:  Lamotrigine (Lamictal) 25 mg tablet      Medication active on med list: yes      Date of last Rx: 04/05/2023  with 1 refills   verified on 05/22/20223   verified by JANEEN FELIPE      Date of last appointment:  04/05/2023    Next Visit Date:  Visit date not found

## 2023-07-12 DIAGNOSIS — G40.909 SEIZURE DISORDER (HCC): ICD-10-CM

## 2023-07-13 NOTE — TELEPHONE ENCOUNTER
Pharmacy requesting refill of Lamictal.      Medication active on med list yes      Date of last fill: 5/22/23 (only for #90)  verified on 7/13/2023   verified by [facility] system LPN      Date of last appointment 4/5/23    Next Visit Date:  Visit date not found

## 2023-07-14 RX ORDER — LAMOTRIGINE 25 MG/1
TABLET ORAL
Qty: 540 TABLET | Refills: 1 | Status: SHIPPED | OUTPATIENT
Start: 2023-07-14

## 2023-11-04 ENCOUNTER — HOSPITAL ENCOUNTER (OUTPATIENT)
Age: 23
Setting detail: SPECIMEN
Discharge: HOME | End: 2023-11-04
Payer: MEDICARE

## 2023-11-04 PROCEDURE — 87086 URINE CULTURE/COLONY COUNT: CPT

## 2023-11-04 PROCEDURE — 81001 URINALYSIS AUTO W/SCOPE: CPT

## 2023-11-05 LAB
BACTERIA URNS QL MICRO: ABNORMAL
BILIRUB UR QL STRIP: NEGATIVE
CASTS #/AREA URNS LPF: ABNORMAL /LPF (ref 0–8)
CLARITY UR: ABNORMAL
COLOR UR: YELLOW
EPI CELLS #/AREA URNS HPF: ABNORMAL /HPF (ref 0–5)
GLUCOSE UR STRIP-MCNC: NEGATIVE MG/DL
HGB UR QL STRIP.AUTO: ABNORMAL
KETONES UR STRIP-MCNC: NEGATIVE MG/DL
LEUKOCYTE ESTERASE UR QL STRIP: ABNORMAL
NITRITE UR QL STRIP: NEGATIVE
PH UR STRIP: 8 [PH] (ref 5–8)
PROT UR STRIP-MCNC: ABNORMAL MG/DL
RBC #/AREA URNS HPF: ABNORMAL /HPF (ref 0–4)
SP GR UR STRIP: 1.01 (ref 1–1.03)
UROBILINOGEN UR STRIP-ACNC: NORMAL EU/DL (ref 0–1)
WBC #/AREA URNS HPF: ABNORMAL /HPF (ref 0–5)

## 2023-11-06 LAB
MICROORGANISM SPEC CULT: NORMAL
SPECIMEN DESCRIPTION: NORMAL

## 2023-11-16 ENCOUNTER — HOSPITAL ENCOUNTER (OUTPATIENT)
Age: 23
Setting detail: SPECIMEN
Discharge: HOME | End: 2023-11-16

## 2023-11-16 DIAGNOSIS — N39.0 RECURRENT UTI: ICD-10-CM

## 2023-11-16 LAB
BACTERIA URNS QL MICRO: ABNORMAL
BILIRUB UR QL STRIP: NEGATIVE
CASTS #/AREA URNS LPF: ABNORMAL /LPF (ref 0–8)
CLARITY UR: ABNORMAL
COLOR UR: YELLOW
EPI CELLS #/AREA URNS HPF: ABNORMAL /HPF (ref 0–5)
GLUCOSE UR STRIP-MCNC: NEGATIVE MG/DL
HGB UR QL STRIP.AUTO: ABNORMAL
KETONES UR STRIP-MCNC: NEGATIVE MG/DL
LEUKOCYTE ESTERASE UR QL STRIP: ABNORMAL
NITRITE UR QL STRIP: POSITIVE
PH UR STRIP: 8 [PH] (ref 5–8)
PROT UR STRIP-MCNC: NEGATIVE MG/DL
RBC #/AREA URNS HPF: ABNORMAL /HPF (ref 0–4)
SP GR UR STRIP: 1.01 (ref 1–1.03)
UROBILINOGEN UR STRIP-ACNC: NORMAL EU/DL (ref 0–1)
WBC #/AREA URNS HPF: ABNORMAL /HPF (ref 0–5)

## 2023-11-17 LAB
MICROORGANISM SPEC CULT: NORMAL
SPECIMEN DESCRIPTION: NORMAL

## 2023-11-21 LAB
MICROORGANISM SPEC CULT: ABNORMAL
SPECIMEN DESCRIPTION: ABNORMAL

## 2023-12-04 ENCOUNTER — HOSPITAL ENCOUNTER (OUTPATIENT)
Age: 23
Setting detail: SPECIMEN
Discharge: HOME | End: 2023-12-04
Payer: MEDICARE

## 2023-12-04 DIAGNOSIS — N31.9 NEUROGENIC BLADDER: ICD-10-CM

## 2023-12-04 DIAGNOSIS — N39.0 RECURRENT UTI: ICD-10-CM

## 2023-12-04 DIAGNOSIS — G80.0 SPASTIC QUADRIPLEGIC CEREBRAL PALSY (HCC): ICD-10-CM

## 2023-12-04 LAB
AMORPH SED URNS QL MICRO: ABNORMAL
BACTERIA URNS QL MICRO: ABNORMAL
BILIRUB UR QL STRIP: NEGATIVE
CASTS #/AREA URNS LPF: ABNORMAL /LPF (ref 0–2)
CASTS #/AREA URNS LPF: ABNORMAL /LPF (ref 0–2)
CLARITY UR: ABNORMAL
COLOR UR: YELLOW
EPI CELLS #/AREA URNS HPF: ABNORMAL /HPF (ref 0–5)
GLUCOSE UR STRIP-MCNC: NEGATIVE MG/DL
HGB UR QL STRIP.AUTO: ABNORMAL
KETONES UR STRIP-MCNC: NEGATIVE MG/DL
LEUKOCYTE ESTERASE UR QL STRIP: ABNORMAL
MUCOUS THREADS URNS QL MICRO: ABNORMAL
NITRITE UR QL STRIP: NEGATIVE
PH UR STRIP: 5.5 [PH] (ref 5–8)
PROT UR STRIP-MCNC: ABNORMAL MG/DL
RBC #/AREA URNS HPF: ABNORMAL /HPF (ref 0–2)
SP GR UR STRIP: 1.03 (ref 1–1.03)
UROBILINOGEN UR STRIP-ACNC: NORMAL EU/DL (ref 0–1)
WBC #/AREA URNS HPF: ABNORMAL /HPF (ref 0–5)

## 2023-12-04 PROCEDURE — 87077 CULTURE AEROBIC IDENTIFY: CPT

## 2023-12-04 PROCEDURE — 86403 PARTICLE AGGLUT ANTBDY SCRN: CPT

## 2023-12-04 PROCEDURE — 81001 URINALYSIS AUTO W/SCOPE: CPT

## 2023-12-04 PROCEDURE — 87186 SC STD MICRODIL/AGAR DIL: CPT

## 2023-12-04 PROCEDURE — 87086 URINE CULTURE/COLONY COUNT: CPT

## 2023-12-06 LAB
MICROORGANISM SPEC CULT: ABNORMAL
MICROORGANISM SPEC CULT: ABNORMAL
SPECIMEN DESCRIPTION: ABNORMAL

## 2024-01-22 PROBLEM — R25.2 SPASTICITY: Status: ACTIVE | Noted: 2022-10-11

## 2024-01-22 PROBLEM — G82.20 PARAPLEGIA (HCC): Status: ACTIVE | Noted: 2022-09-17

## 2024-01-22 PROBLEM — F41.9 ANXIETY: Status: ACTIVE | Noted: 2022-09-19

## 2024-02-14 DIAGNOSIS — G40.909 SEIZURE DISORDER (HCC): ICD-10-CM

## 2024-02-14 NOTE — TELEPHONE ENCOUNTER
Call placed to legal guardian to confirm that AJ is taking medication as prescribed. Patient last refill was 7/14/23 with 90 day supply 1 refill. Patient would of been out of medication 1/10/24. No answer left message to return call.        Pharmacy requesting refill of Lamotrigine 25 mg.      Medication active on med list yes      Date of last Rx: 07/14/23 with 1 refills          verified by JANEEN TRUJILLO      Date of last appointment 04/05/23    Next Visit Date:  5/22/2024

## 2024-02-16 RX ORDER — LAMOTRIGINE 25 MG/1
TABLET ORAL
Qty: 168 TABLET | Refills: 3 | Status: SHIPPED | OUTPATIENT
Start: 2024-02-16

## 2024-03-07 ENCOUNTER — HOSPITAL ENCOUNTER (EMERGENCY)
Age: 24
Discharge: HOME | End: 2024-03-07
Attending: EMERGENCY MEDICINE
Payer: MEDICARE

## 2024-03-07 ENCOUNTER — APPOINTMENT (OUTPATIENT)
Dept: GENERAL RADIOLOGY | Age: 24
End: 2024-03-07
Payer: MEDICARE

## 2024-03-07 VITALS
RESPIRATION RATE: 16 BRPM | HEART RATE: 84 BPM | WEIGHT: 128 LBS | OXYGEN SATURATION: 92 % | DIASTOLIC BLOOD PRESSURE: 89 MMHG | TEMPERATURE: 98.2 F | SYSTOLIC BLOOD PRESSURE: 123 MMHG | BODY MASS INDEX: 23.55 KG/M2 | HEIGHT: 62 IN

## 2024-03-07 DIAGNOSIS — N30.00 ACUTE CYSTITIS WITHOUT HEMATURIA: Primary | ICD-10-CM

## 2024-03-07 LAB
BACTERIA URNS QL MICRO: ABNORMAL
BILIRUB UR QL STRIP: NEGATIVE
CHARACTER UR: ABNORMAL
CLARITY UR: CLEAR
EPI CELLS #/AREA URNS HPF: ABNORMAL /HPF (ref 0–5)
FLUAV AG SPEC QL: NEGATIVE
FLUBV AG SPEC QL: NEGATIVE
HGB UR QL STRIP.AUTO: NEGATIVE
LEUKOCYTE ESTERASE UR QL STRIP: ABNORMAL
MUCOUS THREADS URNS QL MICRO: ABNORMAL
NITRITE UR QL STRIP: NEGATIVE
PH UR STRIP: 6 [PH] (ref 5–8)
PROT UR STRIP-MCNC: NEGATIVE MG/DL
RBC #/AREA URNS HPF: ABNORMAL /HPF (ref 0–2)
SARS-COV-2 RDRP RESP QL NAA+PROBE: NOT DETECTED
SP GR UR STRIP: 1.02 (ref 1–1.03)
SPECIMEN DESCRIPTION: NORMAL
SPECIMEN SOURCE: NORMAL
STREP A, MOLECULAR: NEGATIVE
UROBILINOGEN UR STRIP-ACNC: NORMAL EU/DL (ref 0–1)
WBC #/AREA URNS HPF: ABNORMAL /HPF (ref 0–5)

## 2024-03-07 PROCEDURE — 87186 SC STD MICRODIL/AGAR DIL: CPT

## 2024-03-07 PROCEDURE — 71045 X-RAY EXAM CHEST 1 VIEW: CPT

## 2024-03-07 PROCEDURE — 87635 SARS-COV-2 COVID-19 AMP PRB: CPT

## 2024-03-07 PROCEDURE — 6370000000 HC RX 637 (ALT 250 FOR IP): Performed by: EMERGENCY MEDICINE

## 2024-03-07 PROCEDURE — 87088 URINE BACTERIA CULTURE: CPT

## 2024-03-07 PROCEDURE — 81001 URINALYSIS AUTO W/SCOPE: CPT

## 2024-03-07 PROCEDURE — 87086 URINE CULTURE/COLONY COUNT: CPT

## 2024-03-07 PROCEDURE — 87651 STREP A DNA AMP PROBE: CPT

## 2024-03-07 PROCEDURE — 87804 INFLUENZA ASSAY W/OPTIC: CPT

## 2024-03-07 PROCEDURE — 99284 EMERGENCY DEPT VISIT MOD MDM: CPT

## 2024-03-07 RX ORDER — CEPHALEXIN 250 MG/1
500 CAPSULE ORAL ONCE
Status: COMPLETED | OUTPATIENT
Start: 2024-03-07 | End: 2024-03-07

## 2024-03-07 RX ORDER — CEPHALEXIN 500 MG/1
500 CAPSULE ORAL 2 TIMES DAILY
Qty: 14 CAPSULE | Refills: 0 | Status: SHIPPED | OUTPATIENT
Start: 2024-03-07 | End: 2024-03-14

## 2024-03-07 RX ADMIN — CEPHALEXIN 500 MG: 250 CAPSULE ORAL at 15:56

## 2024-03-07 ASSESSMENT — PAIN - FUNCTIONAL ASSESSMENT: PAIN_FUNCTIONAL_ASSESSMENT: NONE - DENIES PAIN

## 2024-03-07 NOTE — ED PROVIDER NOTES
[facility] Emergency Department  [address]  [facility] [address]  Phone: [phone]  Fax: [phone]        [facility] EMERGENCY DEPARTMENT  EMERGENCY DEPARTMENT ENCOUNTER      Pt Name: Mannie Angulo  MRN: [id number]  Birthdate 2000  Date of evaluation: 3/7/2024  Provider: Iris Pardo MD    CHIEF COMPLAINT       Chief Complaint   Patient presents with    Cough     On and off not feeling well for the last 2 weeks with cough and unable to clear secretions, no fevers, school nurse states diminshed lung sounds          HISTORY OF PRESENT ILLNESS   (Location/Symptom, Timing/Onset,Context/Setting, Quality, Duration, Modifying Factors, Severity)  Note limiting factors.   Mannie Angulo is a 23 y.o. male who presents to the emergency department that comes in today with complaints of dysuria, cough, intermittent fever.    Nursing Notes were reviewed.    REVIEW OF SYSTEMS    (2-9systems for level 4, 10 or more for level 5)     Review of Systems   Constitutional:  Positive for fever.   Genitourinary:  Positive for dysuria.       Except asnoted above the remainder of the review of systems was reviewed and negative.       PAST MEDICAL HISTORY     Past Medical History:   Diagnosis Date    Cerebral palsy (HCC)     Developmental delay Birth    Movement disorder Birth    Seizures (HCC)     Sleep difficulties 2004         SURGICAL HISTORY       Past Surgical History:   Procedure Laterality Date    BACK SURGERY      BACLOFEN PUMP IMPLANTATION      GASTRIC FUNDOPLICATION      HIP SURGERY      XR MIDLINE EQUAL OR GREATER THAN 5 YEARS  2/24/2023    XR MIDLINE EQUAL OR GREATER THAN 5 YEARS 2/24/2023         CURRENT MEDICATIONS     Previous Medications    ALBUTEROL SULFATE  (90 BASE) MCG/ACT INHALER    Inhale 2 puffs into the lungs every 4 hours as needed    AMINO ACIDS-PROTEIN HYDROLYS (PRO-STAT) LIQD    30 mLs by Per G Tube route daily Pro-Stat - 30 mL via G-tube  daily.    BACLOFEN, PAIN PUMP REFILL CHARGE,        BISACODYL (FLEET BISACODYL) 10 MG/30ML ENEM RECTAL ENEMA    ADMINISTER 15ML OF ENEMA 10 MINUTES BEFORE THE FLUSH VIA CECOSTOMY    CATHETERS MISC    12 Fr. Latex free, intermittent, closed-system urinary catheters. Use as directed 6 times daily. Patient has been using Cure brand catheters. Use as directed    CLONAZEPAM (KLONOPIN) 0.5 MG TABLET    2 tablets 3 times daily. 0.5 am and 0.5noon  (2 )0.5 mg at night    DIAZEPAM (VALIUM) 10 MG TABLET    Take 0.5 tablets by mouth as needed.    DOCUSATE SODIUM (COLACE PO)    Take by mouth    FEXOFENADINE HCL (MUCINEX ALLERGY PO)    Take by mouth as needed    FLUTICASONE PROPIONATE (FLONASE NA)    by Nasal route as needed    GABAPENTIN (NEURONTIN) 100 MG CAPSULE    Take 2 capsules by mouth in the morning and 2 capsules in the evening. 200mg BID.    GLYCERIN TOPICAL LIQD    Apply topically daily as needed Through flush    LAMOTRIGINE (LAMICTAL) 25 MG TABLET    TAKE 3 TABLETS BY MOUTH TWICE A DAY    LEVETIRACETAM (KEPPRA) 100 MG/ML SOLUTION    2.5 ml in the am and 5 ml nightly.    LORATADINE (CLARITIN) 10 MG TABLET    Take 1 tablet by mouth daily    MELATONIN 10 MG TABS    Take 10 mg by mouth nightly    MISC. DEVICES MISC    Bharathi-key button for cecostomy, 14Fr/4.0cm (replacement)    MISC. DEVICES MISC    30ml syringes for cecostomy and use with extension set    MISC. DEVICES MISC    Entraflow 1000ml Gravity feeding set with Enfit (REF; [id number]; HCPS; quantity 4/month; refills 11.  This is for cecostomy flushes.    MONTELUKAST (SINGULAIR) 10 MG TABLET    Take 1 tablet by mouth nightly    NITROFURANTOIN, MACROCRYSTAL-MONOHYDRATE, (MACROBID) 100 MG CAPSULE    Take 1 capsule by mouth daily    NYSTATIN (MYCOSTATIN) 549207 UNIT/GM POWDER    Dust to the skin around the gastrostomy tube site 3 times daily.    NYSTATIN (MYCOSTATIN) 749467 UNIT/GM POWDER    Apply 3 times daily to gtube site excoriation and as needed.    OMEPRAZOLE

## 2024-03-09 LAB
MICROORGANISM SPEC CULT: ABNORMAL
SPECIMEN DESCRIPTION: ABNORMAL

## 2024-05-21 ASSESSMENT — ENCOUNTER SYMPTOMS
RESPIRATORY NEGATIVE: 1
GASTROINTESTINAL NEGATIVE: 1
EYES NEGATIVE: 1

## 2024-05-22 ENCOUNTER — OFFICE VISIT (OUTPATIENT)
Dept: NEUROLOGY | Age: 24
End: 2024-05-22
Payer: MEDICARE

## 2024-05-22 VITALS
SYSTOLIC BLOOD PRESSURE: 125 MMHG | BODY MASS INDEX: 23.55 KG/M2 | HEART RATE: 79 BPM | WEIGHT: 128 LBS | HEIGHT: 62 IN | DIASTOLIC BLOOD PRESSURE: 76 MMHG

## 2024-05-22 DIAGNOSIS — G40.909 SEIZURE DISORDER (HCC): Primary | ICD-10-CM

## 2024-05-22 DIAGNOSIS — N31.9 NEUROGENIC BLADDER: ICD-10-CM

## 2024-05-22 DIAGNOSIS — F39 MOOD DISORDER (HCC): ICD-10-CM

## 2024-05-22 DIAGNOSIS — G80.0 SPASTIC QUADRIPLEGIC CEREBRAL PALSY (HCC): ICD-10-CM

## 2024-05-22 PROCEDURE — 1036F TOBACCO NON-USER: CPT | Performed by: PSYCHIATRY & NEUROLOGY

## 2024-05-22 PROCEDURE — G8420 CALC BMI NORM PARAMETERS: HCPCS | Performed by: PSYCHIATRY & NEUROLOGY

## 2024-05-22 PROCEDURE — 99214 OFFICE O/P EST MOD 30 MIN: CPT | Performed by: PSYCHIATRY & NEUROLOGY

## 2024-05-22 PROCEDURE — G8427 DOCREV CUR MEDS BY ELIG CLIN: HCPCS | Performed by: PSYCHIATRY & NEUROLOGY

## 2024-05-22 RX ORDER — LAMOTRIGINE 100 MG/1
TABLET ORAL
Qty: 60 TABLET | Refills: 6 | Status: SHIPPED | OUTPATIENT
Start: 2024-05-22

## 2024-05-22 NOTE — PATIENT INSTRUCTIONS
Presently taking Lamictal 25 mg tab- three + three  Start taking extra Lamictal tab in evening:- three + four for 2 wk (equalling 75+100)  After 2 wk, take Lamictal tab:- four + four per day (equalling 100+100)

## 2024-05-22 NOTE — PROGRESS NOTES
Catheters MISC 12 Fr. Latex free, intermittent, closed-system urinary catheters. Use as directed 6 times daily. Patient has been using Cure brand catheters. Use as directed 180 each 11    omeprazole (PRILOSEC) 20 MG delayed release capsule  (Patient not taking: Reported on 1/23/2024)      Docusate Sodium (COLACE PO) Take by mouth      triamcinolone (KENALOG) 0.025 % cream Apply once daily to affected area, as needed 1 each 1    levETIRAcetam (KEPPRA) 100 MG/ML solution 2.5 ml in the am and 5 ml nightly. 473 mL 1    sodium phosphate (FLEET) 7-19 GM/118ML Place 1 enema rectally once as needed Via flush      glycerin topical LIQD Apply topically daily as needed Through flush      QUEtiapine (SEROQUEL XR) 50 MG extended release tablet Take 1 tablet by mouth nightly      Fluticasone Propionate (FLONASE NA) by Nasal route as needed      Fexofenadine HCl (MUCINEX ALLERGY PO) Take by mouth as needed      diazePAM (VALIUM) 10 MG tablet Take 0.5 tablets by mouth as needed.      clonazePAM (KLONOPIN) 0.5 MG tablet 2 tablets 3 times daily. 0.5 am and 0.5noon  (2 )0.5 mg at night      gabapentin (NEURONTIN) 100 MG capsule Take 2 capsules by mouth in the morning and 2 capsules in the evening. 200mg BID.      albuterol sulfate  (90 Base) MCG/ACT inhaler Inhale 2 puffs into the lungs every 4 hours as needed      polyethylene glycol (GLYCOLAX) 17 GM/SCOOP powder Take 17 g by mouth as needed (Patient not taking: Reported on 1/23/2024)      Omeprazole (PRILOSEC PO) Take by mouth      trihexyphenidyl (ARTANE) 2 MG tablet Take 2 tablets by mouth 3 times daily      loratadine (CLARITIN) 10 MG tablet Take 1 tablet by mouth daily      montelukast (SINGULAIR) 10 MG tablet Take 1 tablet by mouth nightly      Melatonin 10 MG TABS Take 10 mg by mouth nightly      BACLOFEN, PAIN PUMP REFILL CHARGE,        No current facility-administered medications for this visit.        REVIEW OF SYSTEMS:     Review of Systems   Unable to perform ROS:

## 2024-05-23 ENCOUNTER — HOSPITAL ENCOUNTER (OUTPATIENT)
Age: 24
Discharge: HOME | End: 2024-05-23
Payer: MEDICARE

## 2024-05-23 DIAGNOSIS — N31.9 NEUROGENIC BLADDER: ICD-10-CM

## 2024-05-23 DIAGNOSIS — N39.0 RECURRENT UTI: ICD-10-CM

## 2024-05-23 LAB
BACTERIA URNS QL MICRO: ABNORMAL
BILIRUB UR QL STRIP: NEGATIVE
CASTS #/AREA URNS LPF: ABNORMAL /LPF (ref 0–8)
CLARITY UR: CLEAR
COLOR UR: YELLOW
EPI CELLS #/AREA URNS HPF: ABNORMAL /HPF (ref 0–5)
GLUCOSE UR STRIP-MCNC: NEGATIVE MG/DL
HGB UR QL STRIP.AUTO: ABNORMAL
KETONES UR STRIP-MCNC: NEGATIVE MG/DL
LEUKOCYTE ESTERASE UR QL STRIP: NEGATIVE
NITRITE UR QL STRIP: NEGATIVE
PH UR STRIP: 9 [PH] (ref 5–8)
PROT UR STRIP-MCNC: NEGATIVE MG/DL
RBC #/AREA URNS HPF: ABNORMAL /HPF (ref 0–4)
SP GR UR STRIP: 1.01 (ref 1–1.03)
UROBILINOGEN UR STRIP-ACNC: NORMAL EU/DL (ref 0–1)
WBC #/AREA URNS HPF: ABNORMAL /HPF (ref 0–5)

## 2024-05-23 PROCEDURE — 87086 URINE CULTURE/COLONY COUNT: CPT

## 2024-05-23 PROCEDURE — 81001 URINALYSIS AUTO W/SCOPE: CPT

## 2024-05-24 LAB
MICROORGANISM SPEC CULT: NORMAL
SPECIMEN DESCRIPTION: NORMAL

## 2024-05-30 ENCOUNTER — HOSPITAL ENCOUNTER (OUTPATIENT)
Dept: ULTRASOUND IMAGING | Age: 24
Discharge: HOME | End: 2024-06-01
Payer: MEDICARE

## 2024-05-30 DIAGNOSIS — N31.9 NEUROGENIC BLADDER: ICD-10-CM

## 2024-05-30 PROCEDURE — 76770 US EXAM ABDO BACK WALL COMP: CPT

## 2024-12-11 ENCOUNTER — OFFICE VISIT (OUTPATIENT)
Dept: NEUROLOGY | Age: 24
End: 2024-12-11
Payer: MEDICARE

## 2024-12-11 VITALS — DIASTOLIC BLOOD PRESSURE: 74 MMHG | HEART RATE: 73 BPM | SYSTOLIC BLOOD PRESSURE: 133 MMHG

## 2024-12-11 DIAGNOSIS — G80.0 SPASTIC QUADRIPLEGIC CEREBRAL PALSY (HCC): ICD-10-CM

## 2024-12-11 DIAGNOSIS — G40.909 SEIZURE DISORDER (HCC): Primary | ICD-10-CM

## 2024-12-11 DIAGNOSIS — Z97.8 PRESENCE OF INTRATHECAL BACLOFEN PUMP: ICD-10-CM

## 2024-12-11 DIAGNOSIS — N31.9 NEUROGENIC BLADDER: ICD-10-CM

## 2024-12-11 DIAGNOSIS — F39 MOOD DISORDER (HCC): ICD-10-CM

## 2024-12-11 PROCEDURE — G8420 CALC BMI NORM PARAMETERS: HCPCS | Performed by: PSYCHIATRY & NEUROLOGY

## 2024-12-11 PROCEDURE — G8427 DOCREV CUR MEDS BY ELIG CLIN: HCPCS | Performed by: PSYCHIATRY & NEUROLOGY

## 2024-12-11 PROCEDURE — G8484 FLU IMMUNIZE NO ADMIN: HCPCS | Performed by: PSYCHIATRY & NEUROLOGY

## 2024-12-11 PROCEDURE — 99214 OFFICE O/P EST MOD 30 MIN: CPT | Performed by: PSYCHIATRY & NEUROLOGY

## 2024-12-11 PROCEDURE — 1036F TOBACCO NON-USER: CPT | Performed by: PSYCHIATRY & NEUROLOGY

## 2024-12-11 RX ORDER — LAMOTRIGINE 100 MG/1
TABLET ORAL
Qty: 60 TABLET | Refills: 6 | Status: SHIPPED | OUTPATIENT
Start: 2024-12-11

## 2024-12-11 ASSESSMENT — ENCOUNTER SYMPTOMS
EYES NEGATIVE: 1
GASTROINTESTINAL NEGATIVE: 1
RESPIRATORY NEGATIVE: 1

## 2024-12-11 NOTE — PROGRESS NOTES
NEUROLOGY FOLLOW-UP  Patient Name:  Mannie Angulo  :   2000  Clinic Visit Date: 2024  LOV: 2024      Dear WILLIAM Ling, DO    I saw Mr. Mannie Angulo in follow-up in the office today in continuation of neurologic care.   As you know, he is a 24 y.o.  male with Seizure disorder: nonconvulsive staring spells & tonic sz with sec gen on antisz meds including Lamictal. He has been off of Keppra.    He has been seizure-free since , with his last known seizure occurring at the age of 17 or 18, which was attributed to baclofen withdrawal. His mother reports that he has been responding well to Lamictal 100 mg twice daily, with no significant side effects observed. She also notes an improvement in his aggression and mood stability since the initiation of Lamictal. He has not undergone any blood work since his last visit. His height is reported to be 5 feet 2 inches. His current medication regimen includes Lamictal 100 mg twice daily and Seroquel, prescribed by his primary care physician. He was previously on Depakote and Keppra, but these were discontinued prior to starting Lamictal.      May 2024 visit:  During last visit in 2023., Because of comorbid mood disorder with anger issues; it was decided to decrease Keppra slowly and also to increase Lamictal with a slow upward dose titration.  Today he is brought to the clinic for follow-up by mom stating that mom says \"worked well\". He is completely off of keppra since 2023.  Mom also stated that she did not witness patient to have any staring spells or seizure-like activity.  She further added \"actually he is much more alert and awake and clear since after stopping Keppra\"        Interim History:  Regarding seizure disorder; he has remained seizure-free since .  Because of mood swings with anger issues; he is on slightly decreased dose of Keppra and also was initiated on Lamictal.  Regarding his spasticity; he is

## 2024-12-20 ENCOUNTER — TELEPHONE (OUTPATIENT)
Dept: GASTROENTEROLOGY | Age: 24
End: 2024-12-20

## 2025-03-26 ENCOUNTER — OFFICE VISIT (OUTPATIENT)
Dept: GASTROENTEROLOGY | Age: 25
End: 2025-03-26
Payer: MEDICARE

## 2025-03-26 ENCOUNTER — TELEPHONE (OUTPATIENT)
Dept: GASTROENTEROLOGY | Age: 25
End: 2025-03-26

## 2025-03-26 VITALS
HEART RATE: 82 BPM | BODY MASS INDEX: 23.77 KG/M2 | RESPIRATION RATE: 20 BRPM | HEIGHT: 62 IN | OXYGEN SATURATION: 96 % | SYSTOLIC BLOOD PRESSURE: 131 MMHG | DIASTOLIC BLOOD PRESSURE: 78 MMHG | TEMPERATURE: 98.1 F

## 2025-03-26 DIAGNOSIS — Z93.1 G TUBE FEEDINGS (HCC): Primary | ICD-10-CM

## 2025-03-26 DIAGNOSIS — Z93.1 S/P GASTROSTOMY (HCC): ICD-10-CM

## 2025-03-26 DIAGNOSIS — G80.0 SPASTIC QUADRIPLEGIC CEREBRAL PALSY (HCC): ICD-10-CM

## 2025-03-26 DIAGNOSIS — E46 MALNUTRITION, UNSPECIFIED TYPE: ICD-10-CM

## 2025-03-26 PROCEDURE — 99204 OFFICE O/P NEW MOD 45 MIN: CPT | Performed by: INTERNAL MEDICINE

## 2025-03-26 PROCEDURE — G2211 COMPLEX E/M VISIT ADD ON: HCPCS | Performed by: INTERNAL MEDICINE

## 2025-03-26 PROCEDURE — G8427 DOCREV CUR MEDS BY ELIG CLIN: HCPCS | Performed by: INTERNAL MEDICINE

## 2025-03-26 PROCEDURE — G8420 CALC BMI NORM PARAMETERS: HCPCS | Performed by: INTERNAL MEDICINE

## 2025-03-26 PROCEDURE — 1036F TOBACCO NON-USER: CPT | Performed by: INTERNAL MEDICINE

## 2025-03-26 ASSESSMENT — ENCOUNTER SYMPTOMS
CHOKING: 0
DIARRHEA: 0
COUGH: 0
CONSTIPATION: 0
WHEEZING: 0
COLOR CHANGE: 0
SORE THROAT: 0
ANAL BLEEDING: 0
RECTAL PAIN: 0
VOMITING: 0
ABDOMINAL PAIN: 0
ABDOMINAL DISTENTION: 0
BLOOD IN STOOL: 0
VOICE CHANGE: 0
TROUBLE SWALLOWING: 0
NAUSEA: 0

## 2025-03-26 NOTE — TELEPHONE ENCOUNTER
Pt mom left a vm advising the referred nutritionist in [place] refused service for pt so they need a new referral. Please advise

## 2025-03-26 NOTE — TELEPHONE ENCOUNTER
Pt saw Dr. Almaraz today in clinic. EGD ordered. Per Dr. Almaraz, for procedure he needs a Mi Key G low profile tube 20-English. If facility does not have tube, do not schedule procedure because pt already has a 16-English. Both writer and Dr. Almaraz spoke with Mahogany at [facility] Surgery Scheduling. Mahogany sent a picture of the tube to Dr. Almaraz and he said what they currently have is not what he needs. Dr. Almaraz said the tube needs to specifically say \"low profile on it\". Mahogany said they can order the tube and it should be there on Monday. Dr. Almaraz said to not schedule procedure until surgery has tube in hand. Writer informed pt's Legal Guardian, Claudia Angulo that [facility] is ordering the tube. It should ideally arrive on Monday and if it does, then procedure can be scheduled for Tuesday. Writer informed pt's guardian she will keep her updated and give her a call as soon as the tube is in hand.

## 2025-03-26 NOTE — PROGRESS NOTES
Reason for Referral:       No referring provider defined for this encounter.    Chief Complaint   Patient presents with    New Patient     G-Tube, Hx Ileus, GERD, Constipation           HISTORY OF PRESENT ILLNESS: Mr.Alexander SYDNEY Angulo is a 24 y.o. 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  The patient currently has a cecostomy that is managed by colorectal surgery.  He has a G-tube that was previously a GJ tube and then subsequently converted into G-tube.  He has a 16 Wallisian G-tube that he leaks around from the site otherwise the tube feeding works okay.  Was previously seeing pediatric GI and is now graduating to an adult GI.    He has a past surgical history that includes hx nissen fundoplication; hx gastrostomy tube change; hx spinal fusion; hx intrathecal baclofen pump insertion; hx hip surgery; and Exploratory laparotomy/Reduction of small bowel obstruction/Revision of mitrofanoff anastomosis to the bladder (02/23/2023).     Previous Endoscopies    No formal report available for prior EGD    Previous GI workup       Past Medical,Family, and Social History reviewed and does contribute to the patient presentingcondition.    Patient's PMH/PSH,SH,PSYCH Hx, MEDs, ALLERGIES, and ROS were all reviewed and updated in the appropriate sections.    PAST MEDICAL HISTORY:  Past Medical History:   Diagnosis Date    Cerebral palsy (HCC)     Developmental delay Birth    Movement disorder Birth    Seizures (HCC)     Sleep difficulties 2004       Past Surgical History:   Procedure Laterality Date    BACK SURGERY      BACLOFEN PUMP IMPLANTATION      GASTRIC FUNDOPLICATION      HIP SURGERY      MITROFANOFF PROCEDURE  02/17/2023    XR MIDLINE EQUAL OR GREATER THAN 5 YEARS

## 2025-03-27 NOTE — TELEPHONE ENCOUNTER
Writer notified [facility]'s Central Scheduling and spoke with Mahi in regards to speaking with a Dietician/Nutritionist with Urgent Referral. Trell connected writer to Serg Messina Certified Dietician and Serg noted with writer that she will talk with central scheduling to try and get patient set up for appointment to be seen with her on Monday 03/31/25 at 1145 am. Writer thanked Serg and noted will let Claudia patient's mother (Legal Gardian know of this) Serg also noted will communicate with Secure Message in Epic for follow up after for recommendations. Writer thanked serg. Writer notified Claudia in regards to this and noted will send a My Chart Message as well with this information. Claudia thanked writer. Please advise

## 2025-03-31 ENCOUNTER — HOSPITAL ENCOUNTER (OUTPATIENT)
Dept: NUTRITION | Age: 25
Setting detail: THERAPIES SERIES
Discharge: HOME | End: 2025-03-31
Attending: INTERNAL MEDICINE
Payer: MEDICARE

## 2025-03-31 ENCOUNTER — PREP FOR PROCEDURE (OUTPATIENT)
Dept: GASTROENTEROLOGY | Age: 25
End: 2025-03-31

## 2025-03-31 ENCOUNTER — CLINICAL DOCUMENTATION (OUTPATIENT)
Dept: ONCOLOGY | Age: 25
End: 2025-03-31

## 2025-03-31 ENCOUNTER — TELEPHONE (OUTPATIENT)
Dept: GASTROENTEROLOGY | Age: 25
End: 2025-03-31

## 2025-03-31 VITALS — HEIGHT: 62 IN | BODY MASS INDEX: 23.78 KG/M2

## 2025-03-31 DIAGNOSIS — Z93.1 S/P GASTROSTOMY (HCC): ICD-10-CM

## 2025-03-31 PROBLEM — E46 MALNUTRITION: Status: ACTIVE | Noted: 2025-03-31

## 2025-03-31 PROCEDURE — 97802 MEDICAL NUTRITION INDIV IN: CPT

## 2025-03-31 NOTE — TELEPHONE ENCOUNTER
Royerr started telephone encounter on 03/26/25 after pt's office visit at [facility] for EGD procedure. Coworker documented in procedure telephone encounter for a referral, that had nothing to do with procedure. Writer created this new telephone encounter to keep all procedure information in one telephone encounter.  Below is documentation from original telephone encounter.            3/26/25  2:56 PM  Note     Pt saw Dr. Almaraz today in clinic. EGD ordered. Per Dr. Almaraz, for procedure he needs a Mi Key G low profile tube 20-Namibian. If facility does not have tube, do not schedule procedure because pt already has a 16-Namibian. Both writer and Dr. Almaraz spoke with Mahogany at [facility] Surgery Scheduling. Mahogany sent a picture of the tube to Dr. Almaraz and he said what they currently have is not what he needs. Dr. Almaraz said the tube needs to specifically say \"low profile on it\". Mahogany said they can order the tube and it should be there on Monday. Dr. Almaraz said to not schedule procedure until surgery has tube in hand. Writer informed pt's Legal Guardian, Claudia Angulo that [facility] is ordering the tube. It should ideally arrive on Monday and if it does, then procedure can be scheduled for Tuesday. Writer informed pt's guardian she will keep her updated and give her a call as soon as the tube is in hand.

## 2025-03-31 NOTE — PROGRESS NOTES
[facility] Oncology Nutrition Note:   Chart reviewed as Oncology RD received referral from Dr Almaraz re: G-tube. Referral sent back to provider with recommendation to re-send non-oncologic nutrition referrals to [facility]- [facility] [[id number]] or to [facility] RD as patient has seen in the past.

## 2025-03-31 NOTE — TELEPHONE ENCOUNTER
Writer notified Claudia in regards to questions Claudia had with Peg Tube for patient. A voicemail was left in that Dr. Almaraz would like to do this  As the patient does have leakage, it is to check and make sure there is no underlying infection/inflammation at the site of PEG tube from inside causing leakage.    Writer also noted in voicemail that Nicole SIMON will give Claudia a call in regards to not receiving supplies and will call Claudia tomorrow once they are received for patient. Please advise

## 2025-03-31 NOTE — TELEPHONE ENCOUNTER
Writer spoke with Mahogany at [facility] Surgery Scheduling. She said they are still waiting on FedEx and UPS deliveries for today. She will notify writer as soon as requested material is delivered.    Writer called and spoke with pt's mother/Legal Guardian Claudia. Writer informed Claudia potentially the procedure will be scheduled for tomorrow, Tuesday 04/01/25 at 2:30 pm/Arrive at 12:30 pm. Claudia said she has a few questions first. Writer informed Claudia she unfortunately cannot answer the questions she has, it will need to come from a nurse or Dr. Almaraz. Claudia said she appreciated writer being honest that she cannot answer with that information. Claudia said MARTHA has his nutrition appt today at 11:45 am, so she cannot answer for a couple hours. So she is requesting a call back this morning before 11:30 am or this afternoon.     Claudia is not sure why MARTHA needs a whole procedure to do this? She said it doesn't make sense. She has been changing MARTHA's button since he was 4 years old.  Claudia would like either Dr. Almaraz or Jenna to call her and go over information with her.

## 2025-03-31 NOTE — PROGRESS NOTES
Comprehensive Nutrition Assessment    Type and Reason for Visit:  Patient education    Nutrition Recommendations/Plan:   Tube feed recommendation: Annalisa Splore Peptide 1.5 run at 72 mL/hr for 12 hours overnight (864 total volume, 1296 kcal, 64 gm of protein). Run time 8 pm to 8 am. Water flush 50 mL before starting tube feed and after stopping feeds  Continue oral diet as tolerated     Malnutrition Assessment:  Malnutrition Status:  No malnutrition (03/31/25 1330)        Nutrition Assessment:    Patient arrived to the appointment with parents. Patient has a medical history for spastic quadriplegia, cerbral palsy, seizures. baclofen pump, mitrofanoff procedure and marrufo procedure. Patient was followed by peds GI physician but patient now is old enought to transitition on adult GI physician and an new G-tube feeding order is required. Dr. Almaraz will now be the ordering physician. Patient is eating a little more at meals but does not consume enough for adequate nutrition. Parents state patient continues to tolerate Annalisa Farms 1.5 peptide formula and rate is running at 72 mL/hr for 12 hours overnight. Water flush 50 mL before starting tube feed and after disconnecting feeds. Patient is able to consume enough oral fluids to maintain good hydration. Patient's Mi Key G 16 Italian tube has been leaking and Dr. Almaraz wants to have it replaced with a 20 Italian Mi Key G tube in the near future. Parents report patient has gained some weight and now current body weight is 137 lbs. Usual body weight a few years ago was 140 lbs. Recommendation for tube feeding is to continue current formula and rate.    Nutrition Related Findings:    G-tube Wound Type: None       Current Nutrition Intake & Therapies:    Average Meal Intake: 1-25%, 26-50%  Average Supplements Intake: None Ordered  No diet orders on file  Current Tube Feeding (TF) Orders:  Feeding Route: Gastrostomy  Formula: Other Tube Feeding (Annalisa Splore Peptide 1.5)  Schedule: Cyclic

## 2025-04-01 NOTE — TELEPHONE ENCOUNTER
Mom (Claudia) lvm for office to return phone call. Mom stated she is sick and son is not feeling well either.

## 2025-04-02 RX ORDER — NUTRITIONAL SUPPLEMENT/FIBER 0.06 G-1.4
LIQUID (ML) ORAL
Qty: 1200 ML | Refills: 5 | Status: SHIPPED | OUTPATIENT
Start: 2025-04-02

## 2025-04-02 NOTE — TELEPHONE ENCOUNTER
Writer called pt's mother, Claudia; no answer. Writer left voicemail message for Claudia to call office back. Writer was informed by [facility] Surgery Schedulers that requested tube was received and we can go ahead and schedule Aj's procedure if Claudia would like to proceed.

## 2025-04-08 ENCOUNTER — PREP FOR PROCEDURE (OUTPATIENT)
Dept: GASTROENTEROLOGY | Age: 25
End: 2025-04-08

## 2025-04-10 ENCOUNTER — PATIENT MESSAGE (OUTPATIENT)
Dept: GASTROENTEROLOGY | Age: 25
End: 2025-04-10

## 2025-04-16 ENCOUNTER — HOSPITAL ENCOUNTER (OUTPATIENT)
Dept: PREADMISSION TESTING | Age: 25
Discharge: HOME | End: 2025-04-20

## 2025-04-16 ENCOUNTER — TELEPHONE (OUTPATIENT)
Dept: GASTROENTEROLOGY | Age: 25
End: 2025-04-16

## 2025-04-16 VITALS — WEIGHT: 135 LBS | BODY MASS INDEX: 24.84 KG/M2 | HEIGHT: 62 IN

## 2025-04-16 NOTE — TELEPHONE ENCOUNTER
Patient's mom called and feeding supplies were sent to wrong place. Please call mom and to make sure supplies are being sent to right place.

## 2025-04-16 NOTE — PROGRESS NOTES
Pre-op Instructions For Out-Patient Endoscopy Surgery    Medication Instructions:  Please stop herbs and any supplements now (includes vitamins and minerals).    For these medications:  Dulaglutide (Trulicity), Exenatide (Byetta and Bydureon, Liraglutide (Victoza), Lixisenatide (Adlyxin), Semaglutide (Ozempic and Rybelsus), Tirzepatide (Mounjaro, Zepbound)- Stop 1 week prior if taking weekly or 1 day prior if taking every 12 hours or daily.     Please contact your surgeon and prescribing physician for pre-op instructions for any blood thinners.    If you have inhalers/aerosol treatments at home, please use them the morning of your surgery and bring the inhalers with you to the hospital.    Please take the following medications the morning of your surgery with a sip of water:    clonazepam,lamotrigine and artane    Surgery Instructions:  After midnight before surgery:  Do not eat or drink anything, including water, mints, gum, and hard candy.  You may brush your teeth without swallowing.  No smoking, chewing tobacco, or street drugs.     ** Please Follow Bowel Prep instructions if given by surgeon's office**    Please shower or bathe before surgery.       Please do not wear any cologne, lotion, powder, jewelry, piercings, perfume, makeup, nail polish, hair accessories, or hair spray on the day of surgery.  Wear loose comfortable clothing.    Leave your valuables at home but bring a payment source for any after-surgery prescriptions you plan to fill at [facility].  Bring a storage case for any glasses/contacts.    An adult who is responsible for you MUST drive you home and should be with you for the first 24 hours after surgery.     The Day of Surgery:  Arrive at [facility] Surgery Entrance at the time directed by your surgeon and check in at the desk.     If you have a living will or healthcare power of , please bring a copy.    You will be taken to the pre-op holding area where you

## 2025-04-25 NOTE — PRE-PROCEDURE INSTRUCTIONS
No answer, left message ?         YES                    Unable to leave message ?    When were you told to arrive at hospital ?  1100    Do you have a  ?    Are you on any blood thinners ?                     If yes when did you stop taking ?    Do you have your prep Rx filled and instruction ?      Nothing to eat the day before , only clear liquids.    Are you experiencing any covid symptoms ?     Do you have any infections or rash we should be aware of ?      Do you have the Hibiclens soap to use the night before and the morning of surgery ?    Nothing to eat or drink after midnight, only a sip of water to take any medication instructed to take the night before.  Wear comfortable clothing, leave any valuables at home, remove any jewelry and body piercing .   MESSAGE LEFT WITH DETAILS.

## 2025-04-28 ENCOUNTER — ANESTHESIA EVENT (OUTPATIENT)
Dept: ENDOSCOPY | Age: 25
End: 2025-04-28
Payer: MEDICARE

## 2025-04-29 ENCOUNTER — ANESTHESIA (OUTPATIENT)
Dept: ENDOSCOPY | Age: 25
End: 2025-04-29
Payer: MEDICARE

## 2025-04-29 ENCOUNTER — HOSPITAL ENCOUNTER (OUTPATIENT)
Age: 25
Setting detail: OUTPATIENT SURGERY
Discharge: HOME | End: 2025-04-29
Attending: INTERNAL MEDICINE | Admitting: INTERNAL MEDICINE
Payer: MEDICARE

## 2025-04-29 VITALS
WEIGHT: 135 LBS | OXYGEN SATURATION: 98 % | RESPIRATION RATE: 15 BRPM | SYSTOLIC BLOOD PRESSURE: 113 MMHG | DIASTOLIC BLOOD PRESSURE: 61 MMHG | HEIGHT: 62 IN | BODY MASS INDEX: 24.84 KG/M2 | HEART RATE: 59 BPM | TEMPERATURE: 98.2 F

## 2025-04-29 DIAGNOSIS — E46 MALNUTRITION, UNSPECIFIED TYPE: ICD-10-CM

## 2025-04-29 DIAGNOSIS — Z93.1 G TUBE FEEDINGS (HCC): ICD-10-CM

## 2025-04-29 DIAGNOSIS — Z93.1 S/P GASTROSTOMY (HCC): ICD-10-CM

## 2025-04-29 PROCEDURE — 2709999900 HC NON-CHARGEABLE SUPPLY: Performed by: INTERNAL MEDICINE

## 2025-04-29 PROCEDURE — 7100000011 HC PHASE II RECOVERY - ADDTL 15 MIN: Performed by: INTERNAL MEDICINE

## 2025-04-29 PROCEDURE — 7100000010 HC PHASE II RECOVERY - FIRST 15 MIN: Performed by: INTERNAL MEDICINE

## 2025-04-29 PROCEDURE — 3609013300 HC EGD TUBE PLACEMENT: Performed by: INTERNAL MEDICINE

## 2025-04-29 PROCEDURE — 2580000003 HC RX 258: Performed by: ANESTHESIOLOGY

## 2025-04-29 PROCEDURE — 6360000002 HC RX W HCPCS

## 2025-04-29 PROCEDURE — 3700000001 HC ADD 15 MINUTES (ANESTHESIA): Performed by: INTERNAL MEDICINE

## 2025-04-29 PROCEDURE — 88342 IMHCHEM/IMCYTCHM 1ST ANTB: CPT

## 2025-04-29 PROCEDURE — 3700000000 HC ANESTHESIA ATTENDED CARE: Performed by: INTERNAL MEDICINE

## 2025-04-29 PROCEDURE — 88305 TISSUE EXAM BY PATHOLOGIST: CPT

## 2025-04-29 RX ORDER — SODIUM CHLORIDE 9 MG/ML
INJECTION, SOLUTION INTRAVENOUS PRN
Status: CANCELLED | OUTPATIENT
Start: 2025-04-29

## 2025-04-29 RX ORDER — SODIUM CHLORIDE, SODIUM LACTATE, POTASSIUM CHLORIDE, CALCIUM CHLORIDE 600; 310; 30; 20 MG/100ML; MG/100ML; MG/100ML; MG/100ML
INJECTION, SOLUTION INTRAVENOUS CONTINUOUS
Status: DISCONTINUED | OUTPATIENT
Start: 2025-04-29 | End: 2025-04-29 | Stop reason: HOSPADM

## 2025-04-29 RX ORDER — PROPOFOL 10 MG/ML
INJECTION, EMULSION INTRAVENOUS
Status: DISCONTINUED | OUTPATIENT
Start: 2025-04-29 | End: 2025-04-29 | Stop reason: SDUPTHER

## 2025-04-29 RX ORDER — DIPHENHYDRAMINE HYDROCHLORIDE 50 MG/ML
12.5 INJECTION, SOLUTION INTRAMUSCULAR; INTRAVENOUS
Status: CANCELLED | OUTPATIENT
Start: 2025-04-29

## 2025-04-29 RX ORDER — SODIUM CHLORIDE 0.9 % (FLUSH) 0.9 %
5-40 SYRINGE (ML) INJECTION PRN
Status: DISCONTINUED | OUTPATIENT
Start: 2025-04-29 | End: 2025-04-29 | Stop reason: HOSPADM

## 2025-04-29 RX ORDER — SODIUM CHLORIDE 0.9 % (FLUSH) 0.9 %
5-40 SYRINGE (ML) INJECTION EVERY 12 HOURS SCHEDULED
Status: DISCONTINUED | OUTPATIENT
Start: 2025-04-29 | End: 2025-04-29 | Stop reason: HOSPADM

## 2025-04-29 RX ORDER — ONDANSETRON 2 MG/ML
4 INJECTION INTRAMUSCULAR; INTRAVENOUS
Status: CANCELLED | OUTPATIENT
Start: 2025-04-29

## 2025-04-29 RX ORDER — HYDRALAZINE HYDROCHLORIDE 20 MG/ML
10 INJECTION INTRAMUSCULAR; INTRAVENOUS
Status: CANCELLED | OUTPATIENT
Start: 2025-04-29

## 2025-04-29 RX ORDER — LIDOCAINE HYDROCHLORIDE 20 MG/ML
INJECTION, SOLUTION EPIDURAL; INFILTRATION; INTRACAUDAL; PERINEURAL
Status: DISCONTINUED | OUTPATIENT
Start: 2025-04-29 | End: 2025-04-29 | Stop reason: SDUPTHER

## 2025-04-29 RX ORDER — SODIUM CHLORIDE 9 MG/ML
INJECTION, SOLUTION INTRAVENOUS PRN
Status: DISCONTINUED | OUTPATIENT
Start: 2025-04-29 | End: 2025-04-29 | Stop reason: HOSPADM

## 2025-04-29 RX ORDER — SODIUM CHLORIDE 0.9 % (FLUSH) 0.9 %
5-40 SYRINGE (ML) INJECTION EVERY 12 HOURS SCHEDULED
Status: CANCELLED | OUTPATIENT
Start: 2025-04-29

## 2025-04-29 RX ORDER — MIDAZOLAM HYDROCHLORIDE 2 MG/2ML
INJECTION, SOLUTION INTRAMUSCULAR; INTRAVENOUS
Status: DISCONTINUED | OUTPATIENT
Start: 2025-04-29 | End: 2025-04-29 | Stop reason: SDUPTHER

## 2025-04-29 RX ORDER — METOCLOPRAMIDE HYDROCHLORIDE 5 MG/ML
10 INJECTION INTRAMUSCULAR; INTRAVENOUS
Status: CANCELLED | OUTPATIENT
Start: 2025-04-29

## 2025-04-29 RX ORDER — FENTANYL CITRATE 0.05 MG/ML
25 INJECTION, SOLUTION INTRAMUSCULAR; INTRAVENOUS EVERY 5 MIN PRN
Refills: 0 | Status: CANCELLED | OUTPATIENT
Start: 2025-04-29

## 2025-04-29 RX ORDER — LIDOCAINE HYDROCHLORIDE 10 MG/ML
1 INJECTION, SOLUTION EPIDURAL; INFILTRATION; INTRACAUDAL; PERINEURAL
Status: DISCONTINUED | OUTPATIENT
Start: 2025-04-29 | End: 2025-04-29 | Stop reason: HOSPADM

## 2025-04-29 RX ORDER — LABETALOL HYDROCHLORIDE 5 MG/ML
10 INJECTION, SOLUTION INTRAVENOUS
Status: CANCELLED | OUTPATIENT
Start: 2025-04-29

## 2025-04-29 RX ORDER — NALOXONE HYDROCHLORIDE 0.4 MG/ML
INJECTION, SOLUTION INTRAMUSCULAR; INTRAVENOUS; SUBCUTANEOUS PRN
Status: CANCELLED | OUTPATIENT
Start: 2025-04-29

## 2025-04-29 RX ORDER — SODIUM CHLORIDE 0.9 % (FLUSH) 0.9 %
5-40 SYRINGE (ML) INJECTION PRN
Status: CANCELLED | OUTPATIENT
Start: 2025-04-29

## 2025-04-29 RX ADMIN — SODIUM CHLORIDE, POTASSIUM CHLORIDE, SODIUM LACTATE AND CALCIUM CHLORIDE: 600; 310; 30; 20 INJECTION, SOLUTION INTRAVENOUS at 12:24

## 2025-04-29 RX ADMIN — PROPOFOL 120 MCG/KG/MIN: 10 INJECTION, EMULSION INTRAVENOUS at 12:59

## 2025-04-29 RX ADMIN — PROPOFOL 40 MG: 10 INJECTION, EMULSION INTRAVENOUS at 12:58

## 2025-04-29 RX ADMIN — LIDOCAINE HYDROCHLORIDE 80 MG: 20 INJECTION, SOLUTION EPIDURAL; INFILTRATION; INTRACAUDAL; PERINEURAL at 12:58

## 2025-04-29 RX ADMIN — PROPOFOL 30 MG: 10 INJECTION, EMULSION INTRAVENOUS at 13:03

## 2025-04-29 RX ADMIN — MIDAZOLAM HYDROCHLORIDE 2 MG: 1 INJECTION, SOLUTION INTRAMUSCULAR; INTRAVENOUS at 12:52

## 2025-04-29 ASSESSMENT — PAIN - FUNCTIONAL ASSESSMENT
PAIN_FUNCTIONAL_ASSESSMENT: ADULT NONVERBAL PAIN SCALE (NPVS)
PAIN_FUNCTIONAL_ASSESSMENT: 0-10

## 2025-04-29 ASSESSMENT — PAIN SCALES - GENERAL: PAINLEVEL_OUTOF10: 0

## 2025-04-29 NOTE — DISCHARGE INSTRUCTIONS
Sedation or General Anesthesia, Adult  Care After  Refer to this sheet in the next 24 hours. These instructions provide you with information on caring for yourself after your procedure. Your caregiver may also give you more specific instructions. Your treatment has been planned according to current medical practices, but problems sometimes occur. Call your caregiver if you have any problems or questions after your procedure.   HOME CARE INSTRUCTIONS   Do not participate in any activities that require you to be alert or coordinated. Do not:  Drive.  Swim.  Ride a bicycle.  Operate heavy machinery.  Cook.  Use power tools.  Climb ladders.  Work at heights.  Take a bath.  Do not drink alcohol.  Do not make any important decisions or sign legal documents.  Stay with an adult.  The first meal following your procedure should be light and small. Avoid solid foods if you feel sick to your stomach (nauseous) or if you throw up (vomit).  Drink enough fluids to keep your urine clear or pale yellow.  Only take your usual medicines or new medicines if your caregiver approves them.  Only take over-the-counter or prescription medicines for pain, discomfort, or fever as directed by your caregiver.  Keep all follow-up appointments as directed by your caregiver.  SEEK IMMEDIATE MEDICAL CARE IF:   You are not feeling normal or behaving normally after 24 hours.  You have persistent nausea and vomiting.  You are unable to drink fluids or eat food.  You have difficulty urinating.  You have difficulty breathing or speaking.  You have blue or gray skin.  There is difficulty waking or you cannot be woken up.  You have heavy bleeding, redness, or a lot of swelling where the sedative or anesthesia entered your skin (intravenous site).  You have a rash.  MAKE SURE YOU:  Understand these instructions.  Will watch your condition.  Will get help right away if you are not doing well or get worse.  Document Released: 12/18/2006 Document

## 2025-04-29 NOTE — ANESTHESIA POSTPROCEDURE EVALUATION
Department of Anesthesiology  Postprocedure Note    Patient: Mannie Angulo  MRN: [id number]  YOB: 2000  Date of evaluation: 4/29/2025    Procedure Summary       Date: 04/29/25 Room / Location: [place] [address] / [facility]    Anesthesia Start: 1255 Anesthesia Stop: 1318    Procedure: ESOPHAGOGASTRODUODENOSCOPY PERCUTANEOUS ENDOSCOPIC GASTROSTOMY TUBE INSERTION (Rectum) Diagnosis:       G tube feedings (HCC)      S/P gastrostomy (HCC)      Malnutrition, unspecified type      (G tube feedings (HCC) [Z93.1])      (S/P gastrostomy (HCC) [Z93.1])      (Malnutrition, unspecified type [E46])    Surgeons: Mahad Almaraz MD Responsible Provider: Catarina Strauss MD    Anesthesia Type: general, TIVA ASA Status: 3            Anesthesia Type: No value filed.    Mathew Phase I:      Mathew Phase II: Mathew Score: 8    Anesthesia Post Evaluation    Comments: POST- ANESTHESIA EVALUATION       Pt Name: Mannie Angulo  MRN: [id number]  YOB: 2000  Date of evaluation: 4/29/2025  Time:  2:49 PM      /61   Pulse 59   Temp 98.2 °F (36.8 °C)   Resp 15   Ht 1.575 m (5' 2\")   Wt 61.2 kg (135 lb)   SpO2 98%   BMI 24.69 kg/m²      Consciousness Level  Awake  Cardiopulmonary Status  Stable  Pain Adequately Treated YES  Nausea / Vomiting  NO  Adequate Hydration  YES  Anesthesia Related Complications NONE      Electronically signed by Catarina Strauss MD on 4/29/2025 at 2:49 PM      No notable events documented.

## 2025-04-29 NOTE — OP NOTE
EGD report    Esophagogastroduodenoscopy (EGD) Procedure Note    Procedure:  EGD with PEG tube replacement    Procedure Date: 4/29/2025    Indications:  Leaking PEG tube, need replacement    Sedation:  MAC    Attending Physician:  Dr. Mahad Almaraz MD    Assistant:  None    Procedure Details:    Informed consent was obtained for the procedure, including sedation. Risks of infection, perforation, hemorrhage, adverse drug reaction, and aspiration were discussed. The patient was placed in the left lateral decubitus position. The patient was monitored continuously with ECG tracing, pulse oximetry, blood pressure monitoring, and direct observation.      The gastroscope was inserted into the mouth and advanced under direct vision to second portion of the duodenum.  A careful inspection was made as the gastroscope was withdrawn, including a retroflexed view of the proximal stomach; findings and interventions are described below. Appropriate photodocumentation was obtained.    Findings:  Retropharyngeal area was grossly normal appearing     Esophagus: normal                          Esophagogastric markings: Diaphragmatic hiatus- 38 cm; GE junction- 38 cm     Stomach:    Fundus: 2 polyps, 3 mm and 6 mm noted.  The first polyp was resected with large biopsy forceps and the second 1 with cold snare.    Body: The previously placed low-profile Justino button was noted on the anterior wall.  The balloon was deflated and the 16 [place]i tube was removed.  A 20 [place]i low-profile Justino button was inserted and the balloon was inflated with 10 cc normal saline.  Location confirmed endoscopically.    Antrum: normal     Duodenum:     Bulb: normal    First part: Normal    Second Part: Normal      Complications:  None           Estimated blood loss: Minimal    Disposition: Home           Condition: Stable    Specimen Removed: Gastric polyps    Impression:    2 gastric polyps, 3 mm and 6 mm in gastric fundus, resected with large cold

## 2025-04-29 NOTE — ANESTHESIA PRE PROCEDURE
Department of Anesthesiology  Preprocedure Note       Name:  Mannie Angulo   Age:  24 y.o.  :  2000                                          MRN:  [id number]         Date:  2025      Surgeon: Surgeon(s):  Mahad Almaraz MD    Procedure: Procedure(s):  ESOPHAGOGASTRODUODENOSCOPY PERCUTANEOUS ENDOSCOPIC GASTROSTOMY TUBE INSERTION    Medications prior to admission:   Prior to Admission medications    Medication Sig Start Date End Date Taking? Authorizing Provider   Nutritional Supplements (FigCard PEPTIDE 1.5) LIQD 100 ml / hr for 12 hours (8 pm- 8am) 25   Mahad Almaraz MD   nitrofurantoin, macrocrystal-monohydrate, (MACROBID) 100 MG capsule TAKE 1 CAPSULE BY MOUTH DAILY 25   Dottie Loaiza APRN - CNP   QUEtiapine (SEROQUEL) 100 MG tablet Take 1 tablet by mouth at bedtime 25   Waldo Ugarte MD   lamoTRIgine (LAMICTAL) 100 MG tablet Take one tab twice daily 24   Javi Izaguirre MD   FLEET BISACODYL 10 MG/30ML ENEM rectal enema ADMINISTER 15ML OF ENEMA 10 MINUTES BEFORE THE FLUSH VIA CECOSTOMY 24   Ananth Elam APRN - CNP   clonazePAM (KLONOPIN) 1 MG tablet  24   Waldo Ugarte MD   diazePAM 5 MG/5ML SOLN 7 mg by Gastric Tube route as needed. 24   Waldo Ugarte MD   Nutritional Supplements (FigCard PEPTIDE 1.5) LIQD 1,200 mLs by Per G Tube route daily MyDROBE Peptide 1.5 - run on pump at 100 mL/hr x 12 hrs/day. 3/13/24   Ananth Elam APRN - CNP   Amino Acids-Protein Hydrolys (PRO-STAT) LIQD 30 mLs by Per G Tube route daily Pro-Stat - 30 mL via G-tube daily. 3/13/24   Ananth Elam APRN - CNP   Misc. Devices MISC Bharathi-key button for cecostomy, 14Fr/4.0cm (replacement) 24   Titgemeyer, Cebie R, APRN - CNP   Misc. Devices MISC 30ml syringes for cecostomy and use with extension set 24   Ananth Elam, APRN - CNP   Misc. Devices MISC Entraflow 1000ml Gravity feeding set with Enfit (REF;

## 2025-04-29 NOTE — H&P
Procedure History and Physical    Pre-Procedural Diagnosis: PEG tube replacement, malnutrition    Indications:  same    Procedure Planned: endoscopy w/ PEG replacement    History Obtained From:  patient    HISTORY OF PRESENT ILLNESS:       The patient is a 24 y.o. male who presents for the above procedure.        Past Medical History:    Past Medical History:   Diagnosis Date    Cerebral palsy (HCC)     Developmental delay Birth    Movement disorder Birth    Seizures (HCC)     Sleep difficulties 2004       Past Surgical History:    Past Surgical History:   Procedure Laterality Date    BACK SURGERY      BACLOFEN PUMP IMPLANTATION      GASTRIC FUNDOPLICATION      GASTROSTOMY TUBE PLACEMENT Left 2004    HIP SURGERY Bilateral     age 6, age9    MITROFANOFF PROCEDURE  02/17/2023    XR MIDLINE EQUAL OR GREATER THAN 5 YEARS  02/24/2023    XR MIDLINE EQUAL OR GREATER THAN 5 YEARS 2/24/2023       Medications:  Current Facility-Administered Medications   Medication Dose Route Frequency Provider Last Rate Last Admin    lidocaine PF 1 % injection 1 mL  1 mL IntraDERmal Once PRN Maria Esther Santos MD        sodium chloride flush 0.9 % injection 5-40 mL  5-40 mL IntraVENous 2 times per day Maria Esther Santos MD        sodium chloride flush 0.9 % injection 5-40 mL  5-40 mL IntraVENous PRN Maria Esther Santos MD        0.9 % sodium chloride infusion   IntraVENous PRN Maria Esther Santos MD        lactated ringers infusion   IntraVENous Continuous Maria Esther Santos  mL/hr at 04/29/25 1236 NoRateChange at 04/29/25 1236       Allergies:   Allergies   Allergen Reactions    Vancomycin Rash     Red Man    Wound Dressings Rash     Transparent dressing caused rash and redness                 Social   Social History     Tobacco Use    Smoking status: Never     Passive exposure: Never    Smokeless tobacco: Never   Substance Use Topics    Alcohol use: No        Family History   Adopted: Yes   Family history unknown: Yes      No family 
03/31/2025    Spasticity 10/11/2022    Anxiety 09/19/2022    Paraplegia ([facility]) 09/17/2022    Osteomyelitis ([facility]) 10/09/2020    GERD (gastroesophageal reflux disease) 01/13/2020    Retention of urine 01/02/2019    Other constipation 09/04/2018    History of recurrent UTIs 09/04/2018    Decubitus ulcer of sacral region, stage 4 ([facility]) 09/04/2018    G tube feedings ([facility]) 09/04/2018    Pressure ulcer of coccygeal region, stage II ([facility]) 06/07/2018    History of spinal fusion for scoliosis 03/26/2018    Scoliosis 03/26/2018    Urinary incontinence 11/08/2016    Seizure ([facility]) 02/15/2016    Asthma 02/15/2016    Cerebral palsy ([facility]) 11/13/2015    Presence of intrathecal baclofen pump 09/09/2015    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

## 2025-05-01 ENCOUNTER — RESULTS FOLLOW-UP (OUTPATIENT)
Dept: GASTROENTEROLOGY | Age: 25
End: 2025-05-01

## 2025-05-01 LAB — SURGICAL PATHOLOGY REPORT: NORMAL

## 2025-05-02 ENCOUNTER — TELEPHONE (OUTPATIENT)
Dept: GASTROENTEROLOGY | Age: 25
End: 2025-05-02

## 2025-05-02 NOTE — TELEPHONE ENCOUNTER
Mother lvm requesting order for additional button to have at home. Requesting call back at [phone]

## 2025-05-02 NOTE — TELEPHONE ENCOUNTER
Return call back Claudia and voicemail left that Writer received message that a button was wanting to be ordered. Writer noted in voicemail that this message will be sent to Dr. Almaraz to see in regards to ordering this. Please advise.

## 2025-06-02 ENCOUNTER — TELEPHONE (OUTPATIENT)
Dept: GASTROENTEROLOGY | Age: 25
End: 2025-06-02

## 2025-07-22 DIAGNOSIS — G40.909 SEIZURE DISORDER (HCC): ICD-10-CM

## 2025-07-22 NOTE — TELEPHONE ENCOUNTER
Pharmacy requesting refill of lamotrigine 100 mg.      Medication active on med list yes      Date of last Rx: 12/11/2024 with 6 refills          verified by JANEEN HARPER      Date of last appointment 12/11/2024    Next Visit Date:  Visit date not found

## 2025-07-23 RX ORDER — LAMOTRIGINE 100 MG/1
100 TABLET ORAL 2 TIMES DAILY
Qty: 60 TABLET | Refills: 5 | Status: SHIPPED | OUTPATIENT
Start: 2025-07-23

## (undated) DEVICE — DEFENDO AIR WATER SUCTION AND BIOPSY VALVE KIT FOR  OLYMPUS: Brand: DEFENDO AIR/WATER/SUCTION AND BIOPSY VALVE

## (undated) DEVICE — BITEBLOCK 54FR W/ DENT RIM BLOX

## (undated) DEVICE — POLYP TRAP: Brand: TRAPEASE®

## (undated) DEVICE — ENDO KIT W/SYRINGE: Brand: MEDLINE INDUSTRIES, INC.

## (undated) DEVICE — SNARE ENDOSCP AD SM L240CM LOOP W1.3CM SHTH DIA2.4MM POLYP

## (undated) DEVICE — FORCEPS BX L240CM JAW DIA2.4MM ORNG L CAP W/ NDL DISP RAD

## (undated) DEVICE — Device